# Patient Record
Sex: FEMALE | Race: WHITE
[De-identification: names, ages, dates, MRNs, and addresses within clinical notes are randomized per-mention and may not be internally consistent; named-entity substitution may affect disease eponyms.]

---

## 2018-05-24 ENCOUNTER — HOSPITAL ENCOUNTER (INPATIENT)
Dept: HOSPITAL 17 - NEPD | Age: 68
LOS: 26 days | Discharge: NURSING, REHAB OR LONG-TERM CARE | DRG: 885 | End: 2018-06-19
Attending: PSYCHIATRY & NEUROLOGY | Admitting: PSYCHIATRY & NEUROLOGY
Payer: COMMERCIAL

## 2018-05-24 VITALS
DIASTOLIC BLOOD PRESSURE: 82 MMHG | SYSTOLIC BLOOD PRESSURE: 139 MMHG | OXYGEN SATURATION: 97 % | RESPIRATION RATE: 14 BRPM | HEART RATE: 86 BPM

## 2018-05-24 VITALS
HEART RATE: 83 BPM | DIASTOLIC BLOOD PRESSURE: 119 MMHG | SYSTOLIC BLOOD PRESSURE: 157 MMHG | OXYGEN SATURATION: 97 % | RESPIRATION RATE: 18 BRPM | TEMPERATURE: 99.3 F

## 2018-05-24 VITALS
OXYGEN SATURATION: 94 % | SYSTOLIC BLOOD PRESSURE: 118 MMHG | HEART RATE: 97 BPM | TEMPERATURE: 99.1 F | DIASTOLIC BLOOD PRESSURE: 61 MMHG | RESPIRATION RATE: 18 BRPM

## 2018-05-24 VITALS
DIASTOLIC BLOOD PRESSURE: 87 MMHG | RESPIRATION RATE: 17 BRPM | SYSTOLIC BLOOD PRESSURE: 162 MMHG | OXYGEN SATURATION: 100 % | HEART RATE: 68 BPM

## 2018-05-24 VITALS — HEIGHT: 63 IN | WEIGHT: 197.31 LBS | BODY MASS INDEX: 34.96 KG/M2

## 2018-05-24 VITALS
HEART RATE: 67 BPM | OXYGEN SATURATION: 96 % | DIASTOLIC BLOOD PRESSURE: 82 MMHG | RESPIRATION RATE: 16 BRPM | SYSTOLIC BLOOD PRESSURE: 134 MMHG

## 2018-05-24 DIAGNOSIS — F22: ICD-10-CM

## 2018-05-24 DIAGNOSIS — I25.10: ICD-10-CM

## 2018-05-24 DIAGNOSIS — M25.511: ICD-10-CM

## 2018-05-24 DIAGNOSIS — E78.5: ICD-10-CM

## 2018-05-24 DIAGNOSIS — M19.90: ICD-10-CM

## 2018-05-24 DIAGNOSIS — R45.851: ICD-10-CM

## 2018-05-24 DIAGNOSIS — J44.9: ICD-10-CM

## 2018-05-24 DIAGNOSIS — Z81.1: ICD-10-CM

## 2018-05-24 DIAGNOSIS — G89.4: ICD-10-CM

## 2018-05-24 DIAGNOSIS — I25.2: ICD-10-CM

## 2018-05-24 DIAGNOSIS — K21.9: ICD-10-CM

## 2018-05-24 DIAGNOSIS — I50.9: ICD-10-CM

## 2018-05-24 DIAGNOSIS — I13.0: ICD-10-CM

## 2018-05-24 DIAGNOSIS — Z79.899: ICD-10-CM

## 2018-05-24 DIAGNOSIS — N18.4: ICD-10-CM

## 2018-05-24 DIAGNOSIS — M25.562: ICD-10-CM

## 2018-05-24 DIAGNOSIS — Z96.641: ICD-10-CM

## 2018-05-24 DIAGNOSIS — G40.909: ICD-10-CM

## 2018-05-24 DIAGNOSIS — F31.9: ICD-10-CM

## 2018-05-24 DIAGNOSIS — G47.30: ICD-10-CM

## 2018-05-24 DIAGNOSIS — D64.9: ICD-10-CM

## 2018-05-24 DIAGNOSIS — E87.1: ICD-10-CM

## 2018-05-24 DIAGNOSIS — R73.03: ICD-10-CM

## 2018-05-24 DIAGNOSIS — F29: Primary | ICD-10-CM

## 2018-05-24 DIAGNOSIS — Z91.5: ICD-10-CM

## 2018-05-24 DIAGNOSIS — F41.8: ICD-10-CM

## 2018-05-24 DIAGNOSIS — Z81.8: ICD-10-CM

## 2018-05-24 DIAGNOSIS — Z91.19: ICD-10-CM

## 2018-05-24 DIAGNOSIS — E78.00: ICD-10-CM

## 2018-05-24 DIAGNOSIS — C92.10: ICD-10-CM

## 2018-05-24 LAB
ALBUMIN SERPL-MCNC: 4.2 GM/DL (ref 3.4–5)
ALP SERPL-CCNC: 130 U/L (ref 45–117)
ALT SERPL-CCNC: 15 U/L (ref 10–53)
AST SERPL-CCNC: 26 U/L (ref 15–37)
BACTERIA #/AREA URNS HPF: (no result) /HPF
BASOPHILS # BLD AUTO: 0.2 TH/MM3 (ref 0–0.2)
BASOPHILS NFR BLD: 1.3 % (ref 0–2)
BILIRUB SERPL-MCNC: 0.5 MG/DL (ref 0.2–1)
BUN SERPL-MCNC: 22 MG/DL (ref 7–18)
CALCIUM SERPL-MCNC: 9.2 MG/DL (ref 8.5–10.1)
CHLORIDE SERPL-SCNC: 97 MEQ/L (ref 98–107)
COLOR UR: (no result)
CREAT SERPL-MCNC: 1.4 MG/DL (ref 0.5–1)
EOSINOPHIL # BLD: 0.2 TH/MM3 (ref 0–0.4)
EOSINOPHIL NFR BLD: 1.3 % (ref 0–4)
ERYTHROCYTE [DISTWIDTH] IN BLOOD BY AUTOMATED COUNT: 14.1 % (ref 11.6–17.2)
GFR SERPLBLD BASED ON 1.73 SQ M-ARVRAT: 37 ML/MIN (ref 89–?)
GLUCOSE SERPL-MCNC: 94 MG/DL (ref 74–106)
GLUCOSE UR STRIP-MCNC: (no result) MG/DL
HCO3 BLD-SCNC: 23.1 MEQ/L (ref 21–32)
HCT VFR BLD CALC: 43.2 % (ref 35–46)
HGB BLD-MCNC: 14.5 GM/DL (ref 11.6–15.3)
HGB UR QL STRIP: (no result)
HYALINE CASTS #/AREA URNS LPF: 2 /LPF
KETONES UR STRIP-MCNC: (no result) MG/DL
LYMPHOCYTES # BLD AUTO: 1.5 TH/MM3 (ref 1–4.8)
LYMPHOCYTES NFR BLD AUTO: 11.4 % (ref 9–44)
MCH RBC QN AUTO: 29.7 PG (ref 27–34)
MCHC RBC AUTO-ENTMCNC: 33.5 % (ref 32–36)
MCV RBC AUTO: 88.6 FL (ref 80–100)
MONOCYTE #: 0.9 TH/MM3 (ref 0–0.9)
MONOCYTES NFR BLD: 6.7 % (ref 0–8)
MUCOUS THREADS #/AREA URNS LPF: (no result) /LPF
NEUTROPHILS # BLD AUTO: 10.7 TH/MM3 (ref 1.8–7.7)
NEUTROPHILS NFR BLD AUTO: 79.3 % (ref 16–70)
NITRITE UR QL STRIP: (no result)
PLATELET # BLD: 285 TH/MM3 (ref 150–450)
PMV BLD AUTO: 9.8 FL (ref 7–11)
PROT SERPL-MCNC: 8.5 GM/DL (ref 6.4–8.2)
RBC # BLD AUTO: 4.88 MIL/MM3 (ref 4–5.3)
SODIUM SERPL-SCNC: 132 MEQ/L (ref 136–145)
SP GR UR STRIP: 1.01 (ref 1–1.03)
SQUAMOUS #/AREA URNS HPF: 1 /HPF (ref 0–5)
URINE LEUKOCYTE ESTERASE: (no result)
WBC # BLD AUTO: 13.5 TH/MM3 (ref 4–11)

## 2018-05-24 PROCEDURE — 83036 HEMOGLOBIN GLYCOSYLATED A1C: CPT

## 2018-05-24 PROCEDURE — 80048 BASIC METABOLIC PNL TOTAL CA: CPT

## 2018-05-24 PROCEDURE — 80053 COMPREHEN METABOLIC PANEL: CPT

## 2018-05-24 PROCEDURE — 80061 LIPID PANEL: CPT

## 2018-05-24 PROCEDURE — 93005 ELECTROCARDIOGRAM TRACING: CPT

## 2018-05-24 PROCEDURE — 80307 DRUG TEST PRSMV CHEM ANLYZR: CPT

## 2018-05-24 PROCEDURE — 99285 EMERGENCY DEPT VISIT HI MDM: CPT

## 2018-05-24 PROCEDURE — 85025 COMPLETE CBC W/AUTO DIFF WBC: CPT

## 2018-05-24 PROCEDURE — 81001 URINALYSIS AUTO W/SCOPE: CPT

## 2018-05-24 PROCEDURE — 84443 ASSAY THYROID STIM HORMONE: CPT

## 2018-05-24 RX ADMIN — LISINOPRIL SCH MG: 5 TABLET ORAL at 13:00

## 2018-05-24 RX ADMIN — METOPROLOL TARTRATE SCH MG: 25 TABLET, FILM COATED ORAL at 20:52

## 2018-05-24 RX ADMIN — PACLITAXEL SCH MG: 6 INJECTION, SOLUTION, CONCENTRATE INTRAVENOUS at 10:00

## 2018-05-24 NOTE — PD
HPI


Chief Complaint:  Psychiatric Symptoms


Time Seen by Provider:  01:48


Travel History


International Travel<30 days:  No


Contact w/Intl Traveler<30days:  No


Traveled to known affect area:  No





History of Present Illness


HPI


68-year-old white female presents emergency department under [place] act by PD.  

Patient had contacted police advising them that she was feeling suicidal.  The 

patient states that she merely wanted to get out of the house.  She has no plan 

on self-harm.  No homicidal ideation.  She states that she lives with her 

granddaughter.  She had moved back to the area from [place] after living with 

her son.  She states that she is currently under the care of the cancer doctor 

at [facility].  She is being treated for CML.  Patient also has a history 

of.  Anemia arthritis chronic pain syndrome morphine overdose CAD dyslipidemia 

CHF COPD anxiety depression hypertension renal insufficiency myocardial 

infarction seizure sleep apnea D&C tubal ligation .patient denies any toxic 

ingestions.  She denies any drugs or alcohol.  No tobacco.





PFSH


Past Medical History


*** Narrative Medical


CML, anemia arthritis chronic pain syndrome morphine overdose CAD dyslipidemia 

CHF COPD anxiety depression hypertension renal insufficiency myocardial 

infarction seizure sleep apnea D&C tubal ligation


Anemia:  Yes


Arthritis:  Yes (RA and osteo)


Asthma:  Yes


Autoimmune Disease:  Yes (RA)


Anxiety:  Yes


Depression:  Yes


Heart Rhythm Problems:  No


Cancer:  No


Cardiovascular Problems:  Yes (HTN)


High Cholesterol:  Yes


Chest Pain:  Yes


Congestive Heart Failure:  Yes


COPD:  Yes


Cerebrovascular Accident:  No


Diabetes:  No


Diminished Hearing:  No


Endocrine:  No


GERD:  Yes


Glaucoma:  No


Genitourinary:  Yes


Headaches:  No


Hepatitis:  No


Hiatal Hernia:  No


Hypertension:  Yes


Immune Disorder:  Yes


Implanted Vascular Access Dvce:  Yes


Kidney Stones:  No


Musculoskeletal:  Yes


Neurologic:  No


Psychiatric:  Yes


Reproductive:  Yes


Respiratory:  Yes


Migraines:  No


Myocardial Infarction:  Yes


Renal Failure:  Yes


Seizures:  Yes (AROUND 30 YEARS OLD )


Sleep Apnea:  Yes


Thyroid Disease:  No


Ulcer:  No


PNEUMOCCOCAL Vaccine (Year):  2010


Menopausal:  Yes


Dilation and Curettage (D&C):  Yes


Tubal Ligation:  Yes





Past Surgical History


Abdominal Surgery:  No


Appendectomy:  No


Cardiac Surgery:  No


Cholecystectomy:  No


Ear Surgery:  No


Endocrine Surgery:  No


Eye Surgery:  No


Genitourinary Surgery:  No


Gynecologic Surgery:  Yes


Joint Replacement:  Yes (right shoulder,left hip)


Oral Surgery:  No


Thoracic Surgery:  No


Other Surgery:  Yes





Social History


Alcohol Use:  No


Tobacco Use:  No


Substance Use:  No





Allergies-Medications


(Allergen,Severity, Reaction):  


Coded Allergies:  


     azithromycin (Unverified  Allergy, Severe, RASH, 5/24/18)


     buspirone (Unverified  Allergy, Severe, RASH, 5/24/18)


     erythromycin base (Unverified  Allergy, Severe, RASH, 5/24/18)


     penicillin G (Unverified  Allergy, Severe, NAUSEA,VOMITING,RASH, 5/24/18)


     phenelzine (Unverified  Allergy, Severe, RASH,INSOMNIA, 5/24/18)


Reported Meds & Prescriptions





Reported Meds & Active Scripts


Active


Reported


Simvastatin 5 Mg Tab 5 Mg PO DAILY


Lisinopril 5 Mg Tab 5 Mg PO DAILY


Metoprolol Tartrate 25 Mg Tab 25 Mg PO BID








Review of Systems


General / Constitutional:  No: Fever


Eyes:  No: Visual changes


HENT:  Positive: Headaches


Cardiovascular:  No: Chest Pain or Discomfort


Respiratory:  No: Shortness of Breath


Gastrointestinal:  Positive: Constipation, Loss of Appetite, No: Nausea, 

Vomiting, Abdominal Pain


Genitourinary:  No: Dysuria


Musculoskeletal:  Positive: Myalgias, Arthralgias, No: Pain


Skin:  No Rash


Neurologic:  No: Weakness, Syncope, Focal Abnormalities


Psychiatric:  Positive: Mood Disorder, No: Depression, Suicidal Ideations, 

Disorder of Thought, Substance Abuse, Homicidal Ideation


Endocrine:  No: Polydipsia


Hematologic/Lymphatic:  No: Easy Bruising





Physical Exam


Narrative


GENERAL: Well-nourished, well-developed patient.


SKIN: Warm and dry.


HEAD: Normocephalic and atraumatic.


EYES: No scleral icterus. No injection or drainage. 


ENT: No nasal drainage noted. Mucous membranes pink. Airway patent.


NECK: Supple, trachea midline.  Moves head freely without obvious discomfort.


CARDIOVASCULAR: Regular rate and rhythm without murmurs, gallops, or rubs. 


RESPIRATORY: Breath sounds equal bilaterally. No accessory muscle use.


GASTROINTESTINAL: Abdomen soft, non-tender, nondistended. 


EXTREMITIES: No cyanosis or edema.


BACK: Nontender without obvious deformity. No CVA tenderness.


NEURO: Patient is alert and oriented. no sensorimotor deficits.  Nonfocal.  

Normal speech.


PSYCH: No delusions.  No auditory or visual hallucinations.





Data


Data


Last Documented VS





Vital Signs








  Date Time  Temp Pulse Resp B/P (MAP) Pulse Ox O2 Delivery O2 Flow Rate FiO2


 


5/24/18 01:52  67 16 134/82 (99) 96   








Orders





 Orders


Complete Blood Count With Diff (5/24/18 01:48)


Comprehensive Metabolic Panel (5/24/18 01:48)


Thyroid Stimulating Hormone (5/24/18 01:48)


Urinalysis - C+S If Indicated (5/24/18 01:48)


Psych Screen (5/24/18 01:48)


Drug Screen, Random Urine (5/24/18 01:48)


Alcohol (Ethanol) (5/24/18 01:48)





Labs





Laboratory Tests








Test


  5/24/18


02:07


 


White Blood Count 13.5 TH/MM3 


 


Red Blood Count 4.88 MIL/MM3 


 


Hemoglobin 14.5 GM/DL 


 


Hematocrit 43.2 % 


 


Mean Corpuscular Volume 88.6 FL 


 


Mean Corpuscular Hemoglobin 29.7 PG 


 


Mean Corpuscular Hemoglobin


Concent 33.5 % 


 


 


Red Cell Distribution Width 14.1 % 


 


Platelet Count 285 TH/MM3 


 


Mean Platelet Volume 9.8 FL 


 


Neutrophils (%) (Auto) 79.3 % 


 


Lymphocytes (%) (Auto) 11.4 % 


 


Monocytes (%) (Auto) 6.7 % 


 


Eosinophils (%) (Auto) 1.3 % 


 


Basophils (%) (Auto) 1.3 % 


 


Neutrophils # (Auto) 10.7 TH/MM3 


 


Lymphocytes # (Auto) 1.5 TH/MM3 


 


Monocytes # (Auto) 0.9 TH/MM3 


 


Eosinophils # (Auto) 0.2 TH/MM3 


 


Basophils # (Auto) 0.2 TH/MM3 


 


CBC Comment DIFF FINAL 


 


Differential Comment  


 


Urine Color LIGHT-YELLOW 


 


Urine Turbidity CLEAR 


 


Urine pH 6.0 


 


Urine Specific Gravity 1.006 


 


Urine Protein NEG mg/dL 


 


Urine Glucose (UA) NEG mg/dL 


 


Urine Ketones NEG mg/dL 


 


Urine Occult Blood NEG 


 


Urine Nitrite NEG 


 


Urine Bilirubin NEG 


 


Urine Urobilinogen


  LESS THAN 2.0


MG/DL


 


Urine Leukocyte Esterase TRACE 


 


Urine RBC 1 /hpf 


 


Urine WBC 1 /hpf 


 


Urine Squamous Epithelial


Cells 1 /hpf 


 


 


Urine Bacteria RARE /hpf 


 


Urine Hyaline Casts 2 /lpf 


 


Urine Mucus FEW /lpf 


 


Microscopic Urinalysis Comment


  CULT NOT


INDICATED


 


Blood Urea Nitrogen 22 MG/DL 


 


Creatinine 1.40 MG/DL 


 


Random Glucose 94 MG/DL 


 


Total Protein 8.5 GM/DL 


 


Albumin 4.2 GM/DL 


 


Calcium Level 9.2 MG/DL 


 


Alkaline Phosphatase 130 U/L 


 


Aspartate Amino Transf


(AST/SGOT) 26 U/L 


 


 


Alanine Aminotransferase


(ALT/SGPT) 15 U/L 


 


 


Total Bilirubin 0.5 MG/DL 


 


Sodium Level 132 MEQ/L 


 


Potassium Level 4.0 MEQ/L 


 


Chloride Level 97 MEQ/L 


 


Carbon Dioxide Level 23.1 MEQ/L 


 


Anion Gap 12 MEQ/L 


 


Estimat Glomerular Filtration


Rate 37 ML/MIN 


 


 


Thyroid Stimulating Hormone


3rd Gen 3.400 uIU/ML 


 


 


Urine Opiates Screen NEG 


 


Urine Barbiturates Screen NEG 


 


Urine Amphetamines Screen NEG 


 


Urine Benzodiazepines Screen NEG 


 


Urine Cocaine Screen NEG 


 


Urine Cannabinoids Screen NEG 


 


Ethyl Alcohol Level


  LESS THAN 3


MG/DL











MDM


Medical Decision Making


Medical Screen Exam Complete:  Yes


Emergency Medical Condition:  Yes


Medical Record Reviewed:  Yes


Interpretation(s)





Laboratory Tests








Test


  5/24/18


02:07


 


White Blood Count 13.5 TH/MM3 


 


Red Blood Count 4.88 MIL/MM3 


 


Hemoglobin 14.5 GM/DL 


 


Hematocrit 43.2 % 


 


Mean Corpuscular Volume 88.6 FL 


 


Mean Corpuscular Hemoglobin 29.7 PG 


 


Mean Corpuscular Hemoglobin


Concent 33.5 % 


 


 


Red Cell Distribution Width 14.1 % 


 


Platelet Count 285 TH/MM3 


 


Mean Platelet Volume 9.8 FL 


 


Neutrophils (%) (Auto) 79.3 % 


 


Lymphocytes (%) (Auto) 11.4 % 


 


Monocytes (%) (Auto) 6.7 % 


 


Eosinophils (%) (Auto) 1.3 % 


 


Basophils (%) (Auto) 1.3 % 


 


Neutrophils # (Auto) 10.7 TH/MM3 


 


Lymphocytes # (Auto) 1.5 TH/MM3 


 


Monocytes # (Auto) 0.9 TH/MM3 


 


Eosinophils # (Auto) 0.2 TH/MM3 


 


Basophils # (Auto) 0.2 TH/MM3 


 


CBC Comment DIFF FINAL 


 


Differential Comment  


 


Urine Color LIGHT-YELLOW 


 


Urine Turbidity CLEAR 


 


Urine pH 6.0 


 


Urine Specific Gravity 1.006 


 


Urine Protein NEG mg/dL 


 


Urine Glucose (UA) NEG mg/dL 


 


Urine Ketones NEG mg/dL 


 


Urine Occult Blood NEG 


 


Urine Nitrite NEG 


 


Urine Bilirubin NEG 


 


Urine Urobilinogen


  LESS THAN 2.0


MG/DL


 


Urine Leukocyte Esterase TRACE 


 


Urine RBC 1 /hpf 


 


Urine WBC 1 /hpf 


 


Urine Squamous Epithelial


Cells 1 /hpf 


 


 


Urine Bacteria RARE /hpf 


 


Urine Hyaline Casts 2 /lpf 


 


Urine Mucus FEW /lpf 


 


Microscopic Urinalysis Comment


  CULT NOT


INDICATED


 


Blood Urea Nitrogen 22 MG/DL 


 


Creatinine 1.40 MG/DL 


 


Random Glucose 94 MG/DL 


 


Total Protein 8.5 GM/DL 


 


Albumin 4.2 GM/DL 


 


Calcium Level 9.2 MG/DL 


 


Alkaline Phosphatase 130 U/L 


 


Aspartate Amino Transf


(AST/SGOT) 26 U/L 


 


 


Alanine Aminotransferase


(ALT/SGPT) 15 U/L 


 


 


Total Bilirubin 0.5 MG/DL 


 


Sodium Level 132 MEQ/L 


 


Potassium Level 4.0 MEQ/L 


 


Chloride Level 97 MEQ/L 


 


Carbon Dioxide Level 23.1 MEQ/L 


 


Anion Gap 12 MEQ/L 


 


Estimat Glomerular Filtration


Rate 37 ML/MIN 


 


 


Thyroid Stimulating Hormone


3rd Gen 3.400 uIU/ML 


 


 


Urine Opiates Screen NEG 


 


Urine Barbiturates Screen NEG 


 


Urine Amphetamines Screen NEG 


 


Urine Benzodiazepines Screen NEG 


 


Urine Cocaine Screen NEG 


 


Urine Cannabinoids Screen NEG 


 


Ethyl Alcohol Level


  LESS THAN 3


MG/DL








Differential Diagnosis


MDM: High


Differential diagnoses: Schizophrenia, schizoaffective disorder, bipolar, 

anxiety, depression, adjustment reaction, mood disorder NOS, ODD, depressive 

disorder NOS, dementia, dementia with agitation, psychosis NOS, substance 

induced mood disorder, infection,electrolyte abnormality, malingering.


Narrative Course


Mental health screening discussed with the patient.  Psychiatric screen ordered.





The patient has been medically cleared.





This is medical clearance for psychiatric admission





Diagnosis





 Primary Impression:  


 Medical clearance for psychiatric admission


 Additional Impression:  


 CKD (chronic kidney disease)


 Qualified Codes:  N18.9 - Chronic kidney disease, unspecified


Condition:  Stable











John Hogan May 24, 2018 02:14

## 2018-05-24 NOTE — HHI.HP
Provisional Diagnosis


Admission Date


May 24, 2018 at 10:07





                               Certification of Person's Competence 


                           To Provide Express and Informed Consent





I have personally examined Kristi Carrillo , a person being served at [facility] on, May 24, 2018 10:43.


Express and informed consent means consent voluntarily given in writing, by a 

competent person, after sufficient explanation and disclosure of the subject 

matter involved to enable the person to make a knowing and willful decision 

without any element of force, fraud, deceit, duress, or other form of 

constraint or coercion.





This person is 18 years of age or older, is not now known to be incompetent to 

consent to treatment with a guardian advocate, and does not have a health care 

surrogate or proxy currently making medical treatment decisions.  I have found 

this person to be one of the following:





[] Competent to provide express and informed consent, as defined above, for 

voluntary admission to this facility and is competent to provide express and 

informed consent for treatment.  He/she has the consistent capacity to make 

well reasoned, willful, and knowing decisions concerning his or her medical or 

mental health treatment.  The person fully and consistently understands the 

purpose of the admission for examination/placement and is fully capable of 

personally exercising all rights assured [place]





[] Incompetent to provide express and informed consent to voluntary admission, 

and this is incompetent to provide express and informed consent to treatment.  

The person must be transferred to involuntary status and a petition for a 

guardian advocate filed with the Circuit Court.





[] Refusing to provide express and informed consent to voluntary admission but 

is competent to provide express and informed consent for treatment.  The person 

must be discharged or transferred to involuntary status.





Form shall be completed within 24 hours of a person's arrival at the receiving 

facility and filed in the clinical record of each person:


1. Admitted on a voluntary basis


2. Permitted to provide express and informed consent to his/her own treatment


3. Allowed to transfer from involuntary to voluntary status


4. Prior to permitting a person to consent to his or her own treatment after 

having been previously found incompetent to consent to treatment.





History of Present Illness


Capacity:  Has Capacity


HPI


The patient 68-year-old  woman, domiciled with her granddaughter in 

[facility], , mother of 3 kids, supported by Social Security, psychiatric 

history of alcohol and benzodiazepine use disorder, bipolar disorder, anxiety, 

psychiatric admissions, one suicidal attempt by overdosing, outpatient 

psychiatric care with Dr. Hampton, she is in Lexapro 20 mg, Xanax 2 mg 3 times 

daily, medical history of CML, CAD, CHF, dyslipidemia, hypertension COPD, who 

presents emergency department under [place] act by PD.  Patient had contacted 

police advising them that she was feeling suicidal.  The patient states that 

she merely wanted to get out of the house.  She has no plan on self-harm.  No 

homicidal ideation.  She states that she lives with her granddaughter.  She had 

moved back to the area from [place] after living with her son.  She states 

that she is currently under the care of the cancer doctor at [facility].  

She is being treated for CML.  Patient also has a history of.  Anemia arthritis 

chronic pain syndrome morphine overdose CAD dyslipidemia CHF COPD anxiety 

depression hypertension renal insufficiency myocardial infarction seizure sleep 

apnea D&C tubal ligation .patient denies any toxic ingestions.  EMR was 

reviewed.  Collateral information from her granddaughter Brittany Cruz [phone], was attempted, but unfortunately not obtained at this moment.  Case 

was discussed with ER team.  On psychiatric evaluation the patient is found in 

distress, very anxious stating that she feels scared.  Patient states that if 

you has to go back to what she is living now with her granddaughter she is 

going to kill herself.  She reports that her daughter has been stealing her 

money, stealing her Xanax and talking against her with her family.  The patient 

is very tearful during the evaluation, stating that all her family is now a 

conspiracy "to get me out of my house, to maybe look like a drug addict".  She 

says that her family are recording or her movement with cameras, and as the 

patient states these, she started looking around the room "I know that our, or 

is here, they are recording my behavior to accuse me".  The patient reports 

that her daughter has been stealing her Xanax " and them she comes to me 

accusing me that I am not overusing this medication".  The patient repeats 

multiple times that she prefers to kill herself then going back to her 

granddaughter's house per.  Patient reports symptoms of depression, anxiety, 

she says that she has been feeling restless, with multiple intrusive thoughts 

of her family talking about her, helplessness, hopelessness, suicidal ideation, 

with a plan of overdosing, denies homicidal ideation, she denies visual and 

auditory hallucinations.  Patient is fully oriented 3.  No attention deficit, 

no fluctuation of consciousness.  The patient denies the use of alcohol.  She 

reports that she was an alcoholic in the past but she has being over a year 

sober.  She denies the use of illegal drugs.





Review of Systems


Constitutional:  DENIES: Diaphoretic episodes, Fatigue, Fever, Weight gain, 

Weight loss, Chills, Dizziness, Change in appetite, Night Sweats


Endocrine:  DENIES: Abnorml menstrual pattern, Heat/cold intolerance, Polydipsia

, Polyuria, Polyphagia


Eyes:  DENIES: Blurred vision, Diplopia, Eye inflammation, Eye pain, Vision loss

, Photosensitivity, Double Vision


Ears, nose, mouth, throat:  DENIES: Tinnitus, Hearing loss, Vertigo, Nasal 

discharge, Oral lesions, Throat pain, Hoarseness, Ear Pain, Running Nose, 

Epistaxis, Sinus Pain, Toothache, Odynophagia


Respiratory:  DENIES: Apneas, Cough, Snoring, Wheezing, Hemoptysis, Sputum 

production, Shortness of breath


Cardiovascular:  DENIES: Chest pain, Palpitations, Syncope, Dyspnea on Exertion

, PND, Lower Extremity Edema, Orthopnea, Claudication


Gastrointestinal:  DENIES: Abdominal pain, Black stools, Bloody stools, 

Constipation, Diarrhea, Nausea, Vomiting, Difficulty Swallowing, Anorexia


Genitourinary:  DENIES: Abnormal vaginal bleeding, Dysmenorrhea, Dyspareunia, 

Sexual dysfunction, Urinary frequency, Urinary incontinence, Urgency, Hematuria

, Dysuria, Nocturia, Vaginal discharge


Musculoskeletal:  DENIES: Joint pain, Muscle aches, Stiffness, Joint Swelling, 

Back pain, Neck pain


Integumentary:  DENIES: Abnormal pigmentation, Pruritus, Rash, Nail changes, 

Breast masses, Breast skin changes, Nipple discharge


Hematologic/lymphatic:  DENIES: Bruising, Lymphadenopathy


Immunologic/allergic:  DENIES: Eczema, Urticaria


Neurologic:  DENIES: Abnormal gait, Headache, Localized weakness, Paresthesias, 

Seizures, Speech Problems, Tremor, Poor Balance


Psychiatric:  COMPLAINS OF: Anxiety, Suicidal Ideation, Delusions, DENIES: 

Confusion, Mood changes, Depression, Hallucinations, Agitation, Homicidal 

Ideation





Past Psych History


Violence risk - self (6 mos)


Increased





Substance Abuse History


Drugs/Alcohol past 12 months


Patient has history of alcohol abuse, she is in remission now, she also has 

history of benzodiazepines abuse





Past Family Social History


Coded Allergies:  


     azithromycin (Unverified  Allergy, Severe, RASH, 5/24/18)


     buspirone (Unverified  Allergy, Severe, RASH, 5/24/18)


     erythromycin base (Unverified  Allergy, Severe, RASH, 5/24/18)


     penicillin G (Unverified  Allergy, Severe, NAUSEA,VOMITING,RASH, 5/24/18)


     phenelzine (Unverified  Allergy, Severe, RASH,INSOMNIA, 5/24/18)


Reported Medications


Simvastatin (Simvastatin) 5 Mg Tab, 5 MG PO DAILY for Cholesterol Management, #

30 TAB 0 Refills


   5/24/18


Lisinopril (Lisinopril) 5 Mg Tab, 5 MG PO DAILY for Blood Pressure Management, #

30 TAB 0 Refills


   5/24/18


Metoprolol Tartrate (Metoprolol Tartrate) 25 Mg Tab, 25 MG PO BID, #60 TAB 0 

Refills


   5/24/18


Discontinued Reported Medications


Simvastatin (Zocor 40 mg) 40 Mg Tab, 1 TAB PO HS, TAB


   1/22/15


Furosemide (Lasix 20 Mg  Tab) 20 Mg Tab, 20 MG PO DAILY, TAB


   1/22/15


Lisinopril 10 mg (Lisinopril 10 mg) 10 Mg Tab, 1 TAB PO DAILY, TAB


   1/22/15


Lamotrigine (Lamictal) Unknown Strength Tab, PO DAILY, TAB


   1/22/15


Metoprolol Tartrate (Lopressor) 50 Mg Tab, 50 MG PO BID, #60 TAB


   10/4/13


Clonazepam (Clonazepam) 1 Mg Tab, 1 MG PO QID for ANXIETY, #60 TAB


   10/4/13


Discontinued Scripts


Ranitidine HCl (Zantac 150 Mg Tab) 150 Mg Tab, 150 MG PO BID for GERD, #60 TAB


   Prov:Meme Hernandez, DO         2/14/15


Escitalopram Oxalate (Lexapro) 10 Mg Tab, 10 MG PO DAILY for LYNNE, #30 TAB


   Prov:Meme Hernandez, DO         2/14/15


Temazepam 15 mg (Restoril 15 mg) 15 Mg Cap, 15 MG PO HS Y for insomnia, #30 CAP


   Prov:Meme Hernandez, DO         2/13/15


Nitroglycerin (Nitroglycerin Tab 0.4 Mg Sl) 0.4 Mg Subl, 0.4 MG SL UNSCH Y for 

CHEST PAIN, #50 TAB


   Prov:Meme Hernandez, DO         2/13/15


Morphine Sulfate (Morphine Sulfate IR 15 mg) 15 Mg Tab, 15 MG PO Q12H for PAIN, 

#60 TAB


   Prov:Meme Hernandez, DO         2/13/15


Calcium Carbonate (Calcium Carbonate) 500 Mg Chew, 500 MG CHEW Q12 for LOW JULIA, 

#60 TAB


   Prov:Meme Hernandez, DO         2/13/15


Calcitriol (Rocaltrol) 0.25 Mcg Cap, 0.25 MCG PO DAILY for LOW JULIA, #30 CAP


   Prov:Meme Hernandez, DO         2/13/15


Albuterol/Ipratropium (Resp: Albuterol/Ipratropium 2.5 Mg/0.5 Mg) 1 Amp Nebu, 1 

AMP INH Q4-RT Y for DYSPNEA, #60 BOX


   Prov:Monse HernandezMeme A, DO         2/13/15





Current Medications








 Medications


  (Trade)  Dose


 Ordered  Sig/Randall


 Route  Start Time


 Stop Time Status Last Admin


 


  (Prinivil)  5 mg  DAILY


 PO  5/24/18 10:00


     


 


 


  (Lopressor)  25 mg  BID


 PO  5/24/18 21:00


     


 


 


  (Pravachol)  10 mg  DAILY


 PO  5/24/18 10:00


     


 


 


  (Ativan)  0.5 mg  Q12H  PRN


 PO  5/24/18 10:15


     


 


 


  (Ativan Inj)  0.5 mg  Q12H  PRN


 IM  5/24/18 10:15


     


 


 


  (Tylenol)  650 mg  Q4H  PRN


 PO  5/24/18 10:15


     


 


 


  (Milk Of


 Magnesia Liq)  30 ml  DAILY  PRN


 PO  5/24/18 10:15


     


 


 


  (Mag-Al Plus


 Susp Liq)  30 ml  Q6H  PRN


 PO  5/24/18 10:15


     


 


 


  (Romazicon Inj)  0.2 mg  Q1M  PRN


 IV PUSH  5/24/18 10:15


     


 


 


  (Ativan)  1 mg  Q4H  PRN


 PO  5/24/18 10:15


     


 


 


  (Ativan Inj)  1 mg  Q4H  PRN


 IV PUSH  5/24/18 10:15


     


 


 


  (Ativan)  2 mg  Q2H  PRN


 PO  5/24/18 10:15


     


 


 


  (Ativan Inj)  2 mg  Q2H  PRN


 IV PUSH  5/24/18 10:15


     


 


 


  (Ativan Inj)  2 mg  Q1H  PRN


 IV PUSH  5/24/18 10:15


     


 


 


  (Ativan Inj)  2 mg  Q15M  PRN


 IV PUSH  5/24/18 10:15


     


 








Family Psych History


Patient has a sister and her mother with bipolar disease


Social History


Patient was born and raised in [place], she is now living with her 

granddaughter in [facility], she is , mother of 3 kids, supported by 

Social Security, her highest level of the patient is a west high


Patient's Strengths (min. 2)


Outpatient psychiatric





Physical Exam


Patient is a little bit agitated, restless, stress, but no bessy withdrawal, no 

EPS, no gait disturbance present


Vital Signs





Vital Signs








  Date Time  Temp Pulse Resp B/P (MAP) Pulse Ox O2 Delivery O2 Flow Rate FiO2


 


5/24/18 07:32  68 17 162/87 (112) 100 Room Air  








Lab Results











Test


  5/24/18


02:07


 


White Blood Count 13.5 TH/MM3 


 


Red Blood Count 4.88 MIL/MM3 


 


Hemoglobin 14.5 GM/DL 


 


Hematocrit 43.2 % 


 


Mean Corpuscular Volume 88.6 FL 


 


Mean Corpuscular Hemoglobin 29.7 PG 


 


Mean Corpuscular Hemoglobin


Concent 33.5 % 


 


 


Red Cell Distribution Width 14.1 % 


 


Platelet Count 285 TH/MM3 


 


Mean Platelet Volume 9.8 FL 


 


Neutrophils (%) (Auto) 79.3 % 


 


Lymphocytes (%) (Auto) 11.4 % 


 


Monocytes (%) (Auto) 6.7 % 


 


Eosinophils (%) (Auto) 1.3 % 


 


Basophils (%) (Auto) 1.3 % 


 


Neutrophils # (Auto) 10.7 TH/MM3 


 


Lymphocytes # (Auto) 1.5 TH/MM3 


 


Monocytes # (Auto) 0.9 TH/MM3 


 


Eosinophils # (Auto) 0.2 TH/MM3 


 


Basophils # (Auto) 0.2 TH/MM3 


 


CBC Comment DIFF FINAL 


 


Differential Comment  


 


Urine Color LIGHT-YELLOW 


 


Urine Turbidity CLEAR 


 


Urine pH 6.0 


 


Urine Specific Gravity 1.006 


 


Urine Protein NEG mg/dL 


 


Urine Glucose (UA) NEG mg/dL 


 


Urine Ketones NEG mg/dL 


 


Urine Occult Blood NEG 


 


Urine Nitrite NEG 


 


Urine Bilirubin NEG 


 


Urine Urobilinogen


  LESS THAN 2.0


MG/DL


 


Urine Leukocyte Esterase TRACE 


 


Urine RBC 1 /hpf 


 


Urine WBC 1 /hpf 


 


Urine Squamous Epithelial


Cells 1 /hpf 


 


 


Urine Bacteria RARE /hpf 


 


Urine Hyaline Casts 2 /lpf 


 


Urine Mucus FEW /lpf 


 


Microscopic Urinalysis Comment


  CULT NOT


INDICATED


 


Blood Urea Nitrogen 22 MG/DL 


 


Creatinine 1.40 MG/DL 


 


Random Glucose 94 MG/DL 


 


Total Protein 8.5 GM/DL 


 


Albumin 4.2 GM/DL 


 


Calcium Level 9.2 MG/DL 


 


Alkaline Phosphatase 130 U/L 


 


Aspartate Amino Transf


(AST/SGOT) 26 U/L 


 


 


Alanine Aminotransferase


(ALT/SGPT) 15 U/L 


 


 


Total Bilirubin 0.5 MG/DL 


 


Sodium Level 132 MEQ/L 


 


Potassium Level 4.0 MEQ/L 


 


Chloride Level 97 MEQ/L 


 


Carbon Dioxide Level 23.1 MEQ/L 


 


Anion Gap 12 MEQ/L 


 


Estimat Glomerular Filtration


Rate 37 ML/MIN 


 


 


Thyroid Stimulating Hormone


3rd Gen 3.400 uIU/ML 


 


 


Urine Opiates Screen NEG 


 


Urine Barbiturates Screen NEG 


 


Urine Amphetamines Screen NEG 


 


Urine Benzodiazepines Screen NEG 


 


Urine Cocaine Screen NEG 


 


Urine Cannabinoids Screen NEG 


 


Ethyl Alcohol Level


  LESS THAN 3


MG/DL











Mental Status Examination


Appearance:  Appropriate


Consciousness:  Alert


Orientation:  x4


Motor Activity:  Normal gait


Speech:  Unremarkable


Language:  Adequate


Fund of Knowledge:  Adequate


Attention and Concentration:  Adequate


Memory:  Unremarkable


Mood:  Sad, Irritable


Affect:  Irritable, Sad


Thought Process & Associations:  Intact


Thought Content:  Appropriate


Hallucination Type:  None


Delusion Type:  Paranoid


Suicidal Ideation:  Yes


Suicidal Plan:  Yes


Suicidal Intention:  No


Homicidal Ideation:  No


Homicidal Plan:  No


Homicidal Intention:  No


Insight:  Poor


Judgment:  Poor





Assessment & Plan


Problem List:  


(1) Unspecified psychosis


ICD Codes:  F29 - Unspecified psychosis not due to a substance or known 

physiological condition


Assessment & Plan:  Psychiatric evaluation today the patient presents quite 

distressed, irritable, restless, tearful throughout the interview.  The patient 

reports that she came here because she has been thinking in committing suicide.

  Patient reports that if she has to go back to her granddaughter house she 

will kill herself.  The patient makes several allegations about her daughter or 

her family conspiring to harm her and accuse her "of being using drugs.  

Patient reports that her granddaughter is using cameras to watch her.  She 

states that even the cameras here in the hospital are watching her behavior and 

communicating with her grand daughter.  She may several accusations of her 

granddaughter stealing her medications, stealing her money, even trying to kill 

her.  In the context of this idea, this seems to be quite delusional, the 

patient reports symptomatology of depression, anxiety, anhedonia, hopelessness, 

helplessness, suicidal ideation, with the plans of overdosing.  There is a 

patient with a psychiatric history of bipolar disorder, alcohol and 

benzodiazepines use disorder, multiple psychiatric hospitalizations, suicide 

attempts.  She also has a significant medical history of multiple chronic 

medical conditions.  At this moment, given the level of distress, highly 

suspected paranoid delusions and his persistent suicidal ideation with a plan 

of overdosing, the patient has an increased risk of danger to self and needs 

psychiatric admission for stabilization.  I will start Seroquel 25 mg twice 

daily for psychosis.  Will restart Lexapro 10 mg for anxiety and depression. 

[facility] protocol for suspected benzodiazepine overuse.  The patient states that 

she has been prescribed with Xanax 2 mg 3 times per day by Dr. Hampton, I will 

start clonazepam 1 mg twice daily to cover her anxiety.  No collateral 

information contacted at this moment.  Will consult psychiatry for second 

opinion, medicine for help with medical conditions.  Patient would be 

transferred to the [facility].





Assessment & Plan


Estimated LOS:  Po White MD May 24, 2018 11:04

## 2018-05-25 VITALS
RESPIRATION RATE: 16 BRPM | TEMPERATURE: 97.8 F | SYSTOLIC BLOOD PRESSURE: 102 MMHG | HEART RATE: 82 BPM | OXYGEN SATURATION: 96 % | DIASTOLIC BLOOD PRESSURE: 57 MMHG

## 2018-05-25 VITALS
TEMPERATURE: 97.7 F | DIASTOLIC BLOOD PRESSURE: 76 MMHG | RESPIRATION RATE: 16 BRPM | HEART RATE: 74 BPM | OXYGEN SATURATION: 94 % | SYSTOLIC BLOOD PRESSURE: 124 MMHG

## 2018-05-25 LAB
BUN SERPL-MCNC: 38 MG/DL (ref 7–18)
CALCIUM SERPL-MCNC: 8.9 MG/DL (ref 8.5–10.1)
CHLORIDE SERPL-SCNC: 99 MEQ/L (ref 98–107)
CHOLEST SERPL-MCNC: 282 MG/DL (ref 120–200)
CHOLESTEROL/ HDL RATIO: 6.23 RATIO
CREAT SERPL-MCNC: 1.87 MG/DL (ref 0.5–1)
GFR SERPLBLD BASED ON 1.73 SQ M-ARVRAT: 27 ML/MIN (ref 89–?)
GLUCOSE SERPL-MCNC: 88 MG/DL (ref 74–106)
HBA1C MFR BLD: 6.1 % (ref 4.3–6)
HCO3 BLD-SCNC: 25.5 MEQ/L (ref 21–32)
HDLC SERPL-MCNC: 45.2 MG/DL (ref 40–60)
LDLC SERPL-MCNC: 211 MG/DL (ref 0–99)
SODIUM SERPL-SCNC: 136 MEQ/L (ref 136–145)
TRIGL SERPL-MCNC: 129 MG/DL (ref 42–150)

## 2018-05-25 RX ADMIN — METOPROLOL TARTRATE SCH MG: 25 TABLET, FILM COATED ORAL at 20:49

## 2018-05-25 RX ADMIN — ACETAMINOPHEN PRN MG: 325 TABLET ORAL at 16:33

## 2018-05-25 RX ADMIN — METOPROLOL TARTRATE SCH MG: 25 TABLET, FILM COATED ORAL at 09:00

## 2018-05-25 RX ADMIN — LISINOPRIL SCH MG: 5 TABLET ORAL at 09:00

## 2018-05-25 RX ADMIN — PACLITAXEL SCH MG: 6 INJECTION, SOLUTION, CONCENTRATE INTRAVENOUS at 09:00

## 2018-05-25 NOTE — PD.CONS
HPI


[facility]


Consult Requested By





Primary Care Physician


No Primary Care Physician


Diagnoses:  


History of Present Illness


Mrs. Carrillo is a 68 year old female.  She was admitted secondary to an 

unspecified psychosis.  Consult is placed in regards to medical evaluation and 

management.  At baseline she has chronic kidney disease stage IV.  She is also 

noted to have mild hyponatremia.  At baseline she has CML and follows with 

oncology of [facility] for this.  No complaints of pain when seen.  

Hypertension is present at baseline but currently under good control.  The 

patient reports that her sodium levels often fluctuate and this may be related 

to her chronic kidney disease.  Chronic kidney disease baseline is 

approximately 1.3 to 1.4 for a creatinine.  She is at baseline in regards to 

renal function, presently.





Review of Systems


Constitutional:  DENIES: Fatigue, Fever, Chills


Cardiovascular:  DENIES: Chest pain, Palpitations, Syncope


Gastrointestinal:  DENIES: Abdominal pain, Black stools, Bloody stools


Musculoskeletal:  DENIES: Joint pain, Muscle aches, Stiffness


Integumentary:  DENIES: Abnormal pigmentation, Pruritus, Rash, Nail changes


Hematologic/lymphatic:  DENIES: Bruising, Lymphadenopathy


Immunologic/allergic:  DENIES: Eczema, Urticaria


Neurologic:  DENIES: Abnormal gait, Headache, Paresthesias


Psychiatric:  DENIES: Depression, Suicidal Ideation


Except as stated in HPI:  all other systems reviewed are Neg





Past Family Social History


Allergies:  


Coded Allergies:  


     azithromycin (Unverified  Allergy, Severe, RASH, 5/24/18)


     buspirone (Unverified  Allergy, Severe, RASH, 5/24/18)


     erythromycin base (Unverified  Allergy, Severe, RASH, 5/24/18)


     penicillin G (Unverified  Allergy, Severe, NAUSEA,VOMITING,RASH, 5/24/18)


     phenelzine (Unverified  Allergy, Severe, RASH,INSOMNIA, 5/24/18)


Past Medical History


CML


Chronic kidney disease stage III


Coronary artery disease


CHF


COPD


Hyperlipidemia


Anxiety


Depression


Hypertension


History of old MI


History of seizure


Sleep apnea


Chronic anemia


Osteoarthritis


Chronic pain syndrome


Past Surgical History





Right shoulder surgery


Left hip replacement surgery


Reported Medications





Reported Meds & Active Scripts


Active


Reported


Simvastatin 5 Mg Tab 5 Mg PO DAILY


Lisinopril 5 Mg Tab 5 Mg PO DAILY


Metoprolol Tartrate 25 Mg Tab 25 Mg PO BID


Active Ordered Medications





Administered Medications








 Medications


  (Trade)  Dose


 Ordered  Sig/Randall


 Route


 PRN Reason  Start Time


 Stop Time Status Last Admin


Dose Admin


 


 Lisinopril


  (Prinivil)  5 mg  DAILY


 PO


   5/24/18 10:00


    5/24/18 13:00


 


 


 Metoprolol


 Tartrate


  (Lopressor)  25 mg  BID


 PO


   5/24/18 21:00


    5/24/18 20:52


 


 


 Quetiapine


 Fumarate


  (SEROquel)  25 mg  BID@09,12


 PO


   5/24/18 12:00


    5/24/18 13:00


 


 


 Clonazepam


  (KlonoPIN)  0.5 mg  Q8HR


 PO


   5/24/18 14:00


    5/25/18 05:53


 








Family History


Patient reports only psychiatric conditions, alcoholism, and dependency 

problems in her family history


Social History


No smoking


No alcohol abuse


No illicit drug abuse





Physical Exam


Vital Signs





Vital Signs








  Date Time  Temp Pulse Resp B/P (MAP) Pulse Ox O2 Delivery O2 Flow Rate FiO2


 


5/25/18 05:53 97.7 74 16 124/76 (92) 94   


 


5/24/18 18:25 99.1 97 18 118/61 (80) 94   


 


5/24/18 11:30 99.3 83 18 157/119 (132) 97   


 


5/24/18 11:26        


 


5/24/18 11:00  86 14 139/82 (101) 97 Room Air  








Physical Exam


GENERAL: NAD, A&Ox3


HEAD: Normocephalic. 


NECK: Supple, trachea midline. No lymphadenopathy.


EYES: No scleral icterus. No injection or drainage. 


CARDIOVASCULAR: Regular rate and rhythm without murmurs, gallops, or rubs. 


RESPIRATORY: Breath sounds equal bilaterally. No accessory muscle use.


GASTROINTESTINAL: Abdomen soft, non-tender, nondistended. 


MUSCULOSKELETAL: No cyanosis, or edema. 


SKIN: Warm and dry.


NEURO:  No focal neurological deficitis.


Result Diagram:  


5/24/18 0207                                                                   

             5/24/18 0207








Assessment and Plan


Problem List:  


(1) Unspecified psychosis


ICD Code:  F29 - Unspecified psychosis not due to a substance or known 

physiological condition


(2) HTN (hypertension)


ICD Code:  I10 - HTN (hypertension)


Status:  Acute


(3) CKD (chronic kidney disease)


ICD Code:  N18.9 - CKD (chronic kidney disease)


Status:  Acute


Assessment and Plan


68-year-old female admitted secondary to unspecified psychosis





Chronic kidney disease stage III/IV


Follow renal function for now


Present creatinine levels are approximating her previous baseline





Hyponatremia


No acute treatment needed


Reevaluate sodium level tomorrow morning





CML


Chronic anemia


Follow with oncology as an outpatient


No evidence of acute exacerbation





Hyperlipidemia


Continue present treatment


Follow as an outpatient





Hypertension


Continue baseline treatment


Continue metoprolol


Continue lisinopril


Follow blood pressures


Adjust treatments as needed





Anxiety


Depression


Psychosis


Management per psychiatry





History of seizure


Sleep apnea


Osteoarthritis


Chronic pain syndrome


Coronary artery disease


Old MI


CHF


COPD


No exacerbations


Asymptomatic


No plan changes to baseline treatments





DVT prophylaxis


Ambulation with physical therapy





Problem Qualifiers





(1) CKD (chronic kidney disease):  


Qualified Codes:  N18.9 - Chronic kidney disease, unspecified








Ameya Ahumada MD May 25, 2018 10:02

## 2018-05-25 NOTE — PD.PSY.CON
Provisional Diagnosis


Admission Date


May 24, 2018 at 10:07


Stanley I.


Unspecified psychosis.





History of Present Illness


Service


Psychiatry


Consult Requested By


Dr. Milner


Reason for Consult


Second opinion


Primary Care Physician


No Primary Care Physician


HPI


The patient 68-year-old  woman, domiciled with her granddaughter in 

[facility], , mother of 3 kids, supported by Social Security, psychiatric 

history of alcohol and benzodiazepine use disorder, bipolar disorder, anxiety, 

psychiatric admissions, one suicidal attempt by overdosing, outpatient 

psychiatric care with Dr. Hampton, she is in Lexapro 20 mg, Xanax 2 mg 3 times 

daily, medical history of CML, CAD, CHF, dyslipidemia, hypertension COPD, who 

presents emergency department under [place] act by PD.  Patient had contacted 

police advising them that she was feeling suicidal.  The patient states that 

she merely wanted to get out of the house.  She has no plan on self-harm.  No 

homicidal ideation.  She states that she lives with her granddaughter.  She had 

moved back to the area from [place] after living with her son.  She states 

that she is currently under the care of the cancer doctor at [facility].  

She is being treated for CML.  Patient also has a history of.  Anemia arthritis 

chronic pain syndrome morphine overdose CAD dyslipidemia CHF COPD anxiety 

depression hypertension renal insufficiency myocardial infarction seizure sleep 

apnea D&C tubal ligation .patient denies any toxic ingestions.  EMR was 

reviewed.  Collateral information from her granddaughter Brittany Cruz [phone], was attempted, but unfortunately not obtained at this moment.  Case 

was discussed with ER team.  On psychiatric evaluation the patient is found in 

distress, very anxious stating that she feels scared.  Patient states that if 

you has to go back to what she is living now with her granddaughter she is 

going to kill herself.  She reports that her daughter has been stealing her 

money, stealing her Xanax and talking against her with her family.  The patient 

is very tearful during the evaluation, stating that all her family is now a 

conspiracy "to get me out of my house, to maybe look like a drug addict".  She 

says that her family are recording or her movement with cameras, and as the 

patient states these, she started looking around the room "I know that our, or 

is here, they are recording my behavior to accuse me".  The patient reports 

that her daughter has been stealing her Xanax " and them she comes to me 

accusing me that I am not overusing this medication".  The patient repeats 

multiple times that she prefers to kill herself then going back to her 

granddaughter's house per.  Patient reports symptoms of depression, anxiety, 

she says that she has been feeling restless, with multiple intrusive thoughts 

of her family talking about her, helplessness, hopelessness, suicidal ideation, 

with a plan of overdosing, denies homicidal ideation, she denies visual and 

auditory hallucinations.  Patient is fully oriented 3.  No attention deficit, 

no fluctuation of consciousness.  The patient denies the use of alcohol.  She 

reports that she was an alcoholic in the past but she has being over a year 

sober.  She denies the use of illegal drugs. 





05/25/18 -second opinion 


Patient is a 68-year-old  woman, domiciled with granddaughter, 

, has 3 adult children, unemployed on SSI, with a past psychiatric history of 

bipolar disorder, alcohol and benzodiazepine use disorder, anxiety disorder, 

multiple psychiatric admissions, one previous suicide attempt, with a past 

medical history significant for CML, CAD, CHF, HLD, HTN, COPD, seizure disorder

, MI, who was brought in under [place] act by police due to suicide ideation 

which patient was admitted to the inpatient psychiatry for further evaluation 

and management.  Patient was found lying hospital bed noted B, cooperative.  

Patient states that she is feeling somewhat better and she is "out of the 

situation" referring to her living with her granddaughter at this time.  

Patient states that she has been having difficulty every time she goes back to 

live with her and this is the fourth time.  Patient mentions that she was 

following with Dr. Cordoba oncology clinic last seen a couple months ago after her 

grandmother had told her that she was dismissed from the clinic although she 

cannot receiving letters requesting communication with the clinic as she was 

not attending her appointments.  Patient reports having had decreased sleep, 

appetite, energy and concentration along with feeling depressed but denying any 

suicide ideations at this time.  Patient reports feeling "good" but continues 

to feel worried about her finances as her granddaughter has access to her bank 

accounts.  Patient endorsing some paranoia regarding her granddaughter and that 

they are stealing her money as well as having endorsed being watched in her 

home as well as here in the hospital.





Past Family Social History


Coded Allergies:  


     azithromycin (Unverified  Allergy, Severe, RASH, 5/24/18)


     buspirone (Unverified  Allergy, Severe, RASH, 5/24/18)


     erythromycin base (Unverified  Allergy, Severe, RASH, 5/24/18)


     penicillin G (Unverified  Allergy, Severe, NAUSEA,VOMITING,RASH, 5/24/18)


     phenelzine (Unverified  Allergy, Severe, RASH,INSOMNIA, 5/24/18)


Reported Medications


Simvastatin (Simvastatin) 5 Mg Tab, 5 MG PO DAILY for Cholesterol Management, #

30 TAB 0 Refills


   5/24/18


Lisinopril (Lisinopril) 5 Mg Tab, 5 MG PO DAILY for Blood Pressure Management, #

30 TAB 0 Refills


   5/24/18


Metoprolol Tartrate (Metoprolol Tartrate) 25 Mg Tab, 25 MG PO BID, #60 TAB 0 

Refills


   5/24/18


Discontinued Reported Medications


Simvastatin (Zocor 40 mg) 40 Mg Tab, 1 TAB PO HS, TAB


   1/22/15


Furosemide (Lasix 20 Mg  Tab) 20 Mg Tab, 20 MG PO DAILY, TAB


   1/22/15


Lisinopril 10 mg (Lisinopril 10 mg) 10 Mg Tab, 1 TAB PO DAILY, TAB


   1/22/15


Lamotrigine (Lamictal) Unknown Strength Tab, PO DAILY, TAB


   1/22/15


Metoprolol Tartrate (Lopressor) 50 Mg Tab, 50 MG PO BID, #60 TAB


   10/4/13


Clonazepam (Clonazepam) 1 Mg Tab, 1 MG PO QID for ANXIETY, #60 TAB


   10/4/13


Discontinued Scripts


Ranitidine HCl (Zantac 150 Mg Tab) 150 Mg Tab, 150 MG PO BID for GERD, #60 TAB


   Prov:David,Meme A, DO         2/14/15


Escitalopram Oxalate (Lexapro) 10 Mg Tab, 10 MG PO DAILY for LYNNE, #30 TAB


   Prov:David,Meme A, DO         2/14/15


Temazepam 15 mg (Restoril 15 mg) 15 Mg Cap, 15 MG PO HS Y for insomnia, #30 CAP


   Prov:DavidMeme elias, DO         2/13/15


Nitroglycerin (Nitroglycerin Tab 0.4 Mg Sl) 0.4 Mg Subl, 0.4 MG SL UNSCH Y for 

CHEST PAIN, #50 TAB


   Prov:Meme Hernandez, DO         2/13/15


Morphine Sulfate (Morphine Sulfate IR 15 mg) 15 Mg Tab, 15 MG PO Q12H for PAIN, 

#60 TAB


   Prov:Meme Hernandez, DO         2/13/15


Calcium Carbonate (Calcium Carbonate) 500 Mg Chew, 500 MG CHEW Q12 for LOW JULIA, 

#60 TAB


   Prov:Meme Hernandez, DO         2/13/15


Calcitriol (Rocaltrol) 0.25 Mcg Cap, 0.25 MCG PO DAILY for LOW JULIA, #30 CAP


   Prov:Meme Hernandez, DO         2/13/15


Albuterol/Ipratropium (Resp: Albuterol/Ipratropium 2.5 Mg/0.5 Mg) 1 Amp Nebu, 1 

AMP INH Q4-RT Y for DYSPNEA, #60 BOX


   Prov:Meme Hernandez, DO         2/13/15





Current Medications








 Medications


  (Trade)  Dose


 Ordered  Sig/Randall


 Route  Start Time


 Stop Time Status Last Admin


 


  (Prinivil)  5 mg  DAILY


 PO  5/24/18 10:00


    5/25/18 09:00


 


 


  (Lopressor)  25 mg  BID


 PO  5/24/18 21:00


    5/25/18 09:00


 


 


  (Pravachol)  10 mg  DAILY


 PO  5/24/18 10:00


    5/25/18 09:00


 


 


  (Ativan)  0.5 mg  Q12H  PRN


 PO  5/24/18 10:15


     


 


 


  (Ativan Inj)  0.5 mg  Q12H  PRN


 IM  5/24/18 10:15


     


 


 


  (Tylenol)  650 mg  Q4H  PRN


 PO  5/24/18 10:15


     


 


 


  (Milk Of


 Magnesia Liq)  30 ml  DAILY  PRN


 PO  5/24/18 10:15


     


 


 


  (Mag-Al Plus


 Susp Liq)  30 ml  Q6H  PRN


 PO  5/24/18 10:15


     


 


 


  (Romazicon Inj)  0.2 mg  Q1M  PRN


 IV PUSH  5/24/18 10:15


     


 


 


  (Ativan)  1 mg  Q4H  PRN


 PO  5/24/18 10:15


     


 


 


  (Ativan Inj)  1 mg  Q4H  PRN


 IV PUSH  5/24/18 10:15


     


 


 


  (Ativan)  2 mg  Q2H  PRN


 PO  5/24/18 10:15


     


 


 


  (Ativan Inj)  2 mg  Q2H  PRN


 IV PUSH  5/24/18 10:15


     


 


 


  (Ativan Inj)  2 mg  Q1H  PRN


 IV PUSH  5/24/18 10:15


     


 


 


  (Ativan Inj)  2 mg  Q15M  PRN


 IV PUSH  5/24/18 10:15


     


 


 


  (SEROquel)  25 mg  BID@09,12


 PO  5/24/18 12:00


    5/25/18 12:00


 


 


  (KlonoPIN)  0.5 mg  Q8HR


 PO  5/24/18 14:00


    5/25/18 05:53


 








Patient's Strengths (min. 2)


Outpatient psychiatric





Physical Exam


Vital Signs





Vital Signs








  Date Time  Temp Pulse Resp B/P (MAP) Pulse Ox O2 Delivery O2 Flow Rate FiO2


 


5/25/18 05:53 97.7 74 16 124/76 (92) 94   


 


5/24/18 11:00      Room Air  














I/O   


 


 5/25/18 5/25/18 5/26/18





 08:00 16:00 00:00


 


Intake Total  600 ml 


 


Balance  600 ml 








Lab Results











Test


  5/25/18


10:10


 


Blood Urea Nitrogen 38 MG/DL 


 


Creatinine 1.87 MG/DL 


 


Random Glucose 88 MG/DL 


 


Calcium Level 8.9 MG/DL 


 


Sodium Level 136 MEQ/L 


 


Potassium Level 4.5 MEQ/L 


 


Chloride Level 99 MEQ/L 


 


Carbon Dioxide Level 25.5 MEQ/L 


 


Anion Gap 12 MEQ/L 


 


Estimat Glomerular Filtration


Rate 27 ML/MIN 


 


 


Hemoglobin A1c 6.1 % 


 


Triglycerides Level 129 MG/DL 


 


Cholesterol Level 282 MG/DL 


 


LDL Cholesterol 211 MG/DL 


 


HDL Cholesterol 45.2 MG/DL 


 


Cholesterol/HDL Ratio 6.23 RATIO 











Mental Status Examination


Appearance:  Appropriate


Consciousness:  Alert


Orientation:  x4


Motor Activity:  Normal gait


Speech:  Unremarkable


Language:  Adequate


Fund of Knowledge:  Adequate


Attention and Concentration:  Adequate


Memory:  Unremarkable


Mood:  Sad, Irritable


Affect:  Irritable, Sad


Thought Process & Associations:  Intact


Thought Content:  Appropriate


Hallucination Type:  None


Delusion Type:  Paranoid


Suicidal Ideation:  Yes (denies at this time)


Suicidal Plan:  No


Suicidal Intention:  No


Homicidal Ideation:  No


Homicidal Plan:  No


Homicidal Intention:  No


Insight:  Poor


Judgment:  Poor





Assessment & Plan


Problem List:  


(1) Unspecified psychosis


ICD Codes:  F29 - Unspecified psychosis not due to a substance or known 

physiological condition


Assessment & Plan


I have seen and examined this patient, reviewed the documentation, discussed 

personally with Dr. Milner, and I agree and concur with his assessment and 

plan.  Consult appreciated.


Patient at this time noted with paranoia against the granddaughter along with 

recent suicidal ideations in the context of relational discord living with her 

granddaughter.  Patient to continue quetiapine 25 mg p.o. twice daily, 

clonazepam 1 mg p.o. twice daily, Lexapro 10 mg p.o. daily and will continue on 

CIWA protocol due to history of benzo and alcohol use disorder.  Continue 

monitor mood and behavior.  Discharge planning in progress.


Discharge Planning


To be determined.











Daniel Lundy MD May 25, 2018 16:30

## 2018-05-26 VITALS
HEART RATE: 88 BPM | SYSTOLIC BLOOD PRESSURE: 114 MMHG | OXYGEN SATURATION: 97 % | DIASTOLIC BLOOD PRESSURE: 59 MMHG | TEMPERATURE: 98.1 F | RESPIRATION RATE: 16 BRPM

## 2018-05-26 VITALS
SYSTOLIC BLOOD PRESSURE: 129 MMHG | DIASTOLIC BLOOD PRESSURE: 60 MMHG | OXYGEN SATURATION: 97 % | HEART RATE: 55 BPM | RESPIRATION RATE: 17 BRPM | TEMPERATURE: 98.3 F

## 2018-05-26 VITALS — HEART RATE: 90 BPM

## 2018-05-26 RX ADMIN — LISINOPRIL SCH MG: 5 TABLET ORAL at 09:01

## 2018-05-26 RX ADMIN — METOPROLOL TARTRATE SCH MG: 25 TABLET, FILM COATED ORAL at 09:03

## 2018-05-26 RX ADMIN — PACLITAXEL SCH MG: 6 INJECTION, SOLUTION, CONCENTRATE INTRAVENOUS at 09:03

## 2018-05-26 RX ADMIN — ACETAMINOPHEN PRN MG: 325 TABLET ORAL at 12:01

## 2018-05-26 RX ADMIN — METOPROLOL TARTRATE SCH MG: 25 TABLET, FILM COATED ORAL at 21:00

## 2018-05-26 NOTE — HHI.PYPN
Subjective


Remarks


The patient was seen today for psychiatric reevaluation.  Case discussed with 

nurses in charge.  On the evaluation the patient is calm, cooperative, quite 

focused no going back home "because my daughter-in-law has been stealing my 

medications and she is connected with drug dealer".  Patient clarifies that if 

she has to go back home to live with her family she prefers to kill herself.





Mental Status Examination


Appearance:  Appropriate


Consciousness:  Alert


Orientation:  x4


Motor Activity:  Normal gait


Speech:  Unremarkable


Language:  Adequate


Fund of Knowledge:  Adequate


Attention and Concentration:  Adequate


Memory:  Unremarkable


Mood:  Sad, Irritable


Affect:  Irritable, Sad


Thought Process & Associations:  Intact


Thought Content:  Appropriate


Hallucination Type:  None


Delusion Type:  Paranoid


Suicidal Ideation:  Yes (denies at this time)


Suicidal Plan:  No


Suicidal Intention:  No


Homicidal Ideation:  No


Homicidal Plan:  No


Homicidal Intention:  No


Insight:  Poor


Judgment:  Poor





Results


Vitals/IOs





Vital Signs








  Date Time  Temp Pulse Resp B/P (MAP) Pulse Ox O2 Delivery O2 Flow Rate FiO2


 


5/26/18 06:00 98.3 55 17 129/60 (83) 97   


 


5/24/18 11:00      Room Air  














Intake and Output   


 


 5/26/18 5/26/18 5/27/18





 08:00 16:00 00:00


 


Intake Total 240 ml 840 ml 


 


Balance 240 ml 840 ml 











Assessment & Plan


Problem List:  


(1) Unspecified psychosis


ICD Codes:  F29 - Unspecified psychosis not due to a substance or known 

physiological condition


Assessment & Plan:  Patient continues to shows paranoid delusions.  Continue 

current psychotropic regimen





Assessment & Plan


Estimated LOS:  days


Justification for Cont. Inpt.


Patient continues to show delusional paranoia











Po Milner MD May 26, 2018 15:52

## 2018-05-27 VITALS
OXYGEN SATURATION: 96 % | TEMPERATURE: 98.8 F | HEART RATE: 70 BPM | SYSTOLIC BLOOD PRESSURE: 123 MMHG | RESPIRATION RATE: 18 BRPM | DIASTOLIC BLOOD PRESSURE: 60 MMHG

## 2018-05-27 VITALS
OXYGEN SATURATION: 99 % | HEART RATE: 73 BPM | RESPIRATION RATE: 17 BRPM | DIASTOLIC BLOOD PRESSURE: 70 MMHG | SYSTOLIC BLOOD PRESSURE: 141 MMHG | TEMPERATURE: 98.8 F

## 2018-05-27 LAB
BUN SERPL-MCNC: 33 MG/DL (ref 7–18)
CALCIUM SERPL-MCNC: 9.1 MG/DL (ref 8.5–10.1)
CHLORIDE SERPL-SCNC: 104 MEQ/L (ref 98–107)
CREAT SERPL-MCNC: 1.21 MG/DL (ref 0.5–1)
GFR SERPLBLD BASED ON 1.73 SQ M-ARVRAT: 44 ML/MIN (ref 89–?)
GLUCOSE SERPL-MCNC: 106 MG/DL (ref 74–106)
HCO3 BLD-SCNC: 24 MEQ/L (ref 21–32)
SODIUM SERPL-SCNC: 138 MEQ/L (ref 136–145)

## 2018-05-27 RX ADMIN — FLUTICASONE PROPIONATE SCH SPRAY: 50 SPRAY, METERED NASAL at 14:30

## 2018-05-27 RX ADMIN — METOPROLOL TARTRATE SCH MG: 25 TABLET, FILM COATED ORAL at 09:19

## 2018-05-27 RX ADMIN — LISINOPRIL SCH MG: 5 TABLET ORAL at 09:18

## 2018-05-27 RX ADMIN — FLUTICASONE PROPIONATE SCH SPRAY: 50 SPRAY, METERED NASAL at 21:00

## 2018-05-27 RX ADMIN — METOPROLOL TARTRATE SCH MG: 25 TABLET, FILM COATED ORAL at 21:38

## 2018-05-27 RX ADMIN — PRAVASTATIN SODIUM SCH MG: 40 TABLET ORAL at 09:19

## 2018-05-27 NOTE — HHI.PYPN
Subjective


Remarks


Patient seen and examined with nurse in weekend coverage.  Chart reviewed.  

Case discussed with nursing staff who reports patient is fixated on her anxiety 

and insisting staff perform even menial tasks for her such as brushing her hair 

because of her anxiety.  On my examination today, the patient speaks 

extensively about her benzodiazepines and opiates.  She says that she was 

taking Xanax 2 mg 3 times a day at home as well as an opiate pain medication, 

but her granddaughter's boyfriend has been selling these medications.  She 

alleges that she has been exploited by granddaughter, and nurse confirms for me 

that [facility] has already been involved in this matter.  She denies any suicidal or 

homicidal ideation.  She says that she just threatened suicide prior to 

admission to get the attention of authorities.  No hand tremor, no mydriasis, 

no signs of GABAergic withdrawal.  No side effects from medications.  No 

physical complaints.





Review of Systems


Except as stated in HPI:  all other systems reviewed are Neg





Mental Status Examination


Appearance:  Appropriate


Consciousness:  Alert


Orientation:  x4


Motor Activity:  Other (No motor abnormalities noted)


Speech:  Unremarkable


Language:  Adequate


Fund of Knowledge:  Adequate


Attention and Concentration:  Adequate


Memory:  Unremarkable (Grossly intact on clinical exam)


Mood:  Anxious


Affect:  Anxious


Thought Process & Associations:  Intact


Thought Content:  Appropriate


Hallucination Type:  None


Delusion Type:  Paranoid


Suicidal Ideation:  No


Suicidal Plan:  No


Suicidal Intention:  No


Homicidal Ideation:  No


Homicidal Plan:  No


Homicidal Intention:  No


Insight:  Poor


Judgment:  Poor





Results


Labs


Labs reviewed.  Decreased GFR noted.  BMP for today is pending.


Vitals/IOs





Vital Signs








  Date Time  Temp Pulse Resp B/P (MAP) Pulse Ox O2 Delivery O2 Flow Rate FiO2


 


5/27/18 06:21 98.8 73 17 141/70 (93) 99   


 


5/24/18 11:00      Room Air  














Intake and Output   


 


 5/27/18 5/27/18 5/28/18





 08:00 16:00 00:00


 


Intake Total 0 ml  


 


Balance 0 ml  











Assessment & Plan


Problem List:  


(1) Unspecified psychosis


ICD Codes:  F29 - Unspecified psychosis not due to a substance or known 

physiological condition


Assessment & Plan


Continue current psychotropics as ordered.  Continue to monitor on the 

inpatient unit.  Continue other medications and care as ordered.


Justification for Cont. Inpt.


Risk for decompensation


Discharge Planning


Per primary psychiatrist











Ata Velazquez MD May 27, 2018 08:50

## 2018-05-27 NOTE — HHI.PR
Subjective


Remarks


Follow-up visit chronic anemia, CML, chronic kidney disease stage III-IV, HLD, 

HTN.  Patient seen and examined today laying in bed.  Reports nasal congestion 

and requesting for Afrin.  States she is depressed and is very stressed out of 

her living situations.  She is trying to get that you have sake through either 

skilled nursing facility or assisted living facility to help out.  Denies pain 

and discomfort.  Denies SOB/ dyspnea.  Denies chest pain, palpitations, 

headaches, dizziness. Denies fevers, chills, n/v/d.  Denies dysuria.





Objective


Vitals





Vital Signs








  Date Time  Temp Pulse Resp B/P (MAP) Pulse Ox O2 Delivery O2 Flow Rate FiO2


 


5/27/18 06:21 98.8 73 17 141/70 (93) 99   


 


5/26/18 18:43 98.1 88 16 114/59 (77) 97   














I/O      


 


 5/26/18 5/26/18 5/26/18 5/27/18 5/27/18 5/27/18





 07:00 15:00 23:00 07:00 15:00 23:00


 


Intake Total 240 ml 840 ml 1800 ml 0 ml  


 


Balance 240 ml 840 ml 1800 ml 0 ml  


 


      


 


Intake Oral 240 ml 840 ml 1800 ml 0 ml  


 


# Voids 2  1 2  








Result Diagram:  


5/24/18 0207                                                                   

             5/27/18 0750





Objective Remarks


GENERAL: This is a well-nourished, well-developed patient, in no apparent 

distress.


SKIN: Warm and dry


HEENT: Normocephalic. Pupils equal round and reactive. Nose without bleeding. 

Airway patent.


NECK: Trachea midline. 


CARDIOVASCULAR: Regular rate and rhythm without murmurs, gallops, or rubs. 


RESPIRATORY: No wheezes, rales, or rhonchi.  Diminished bases


GASTROINTESTINAL: Abdomen soft, non-tender, nondistended. Bowel Sounds 

normoactive x4.


MUSCULOSKELETAL: Extremities without clubbing, cyanosis, or edema.


NEUROLOGICAL: Awake and alert. Oriented to place, person. No focal neuro 

deficit.  Moves all extremities. Normal speech.





A/P


Problem List:  


(1) Unspecified psychosis


ICD Code:  F29 - Unspecified psychosis not due to a substance or known 

physiological condition


(2) HTN (hypertension)


ICD Code:  I10 - HTN (hypertension)


Status:  Acute


(3) CKD (chronic kidney disease)


ICD Code:  N18.9 - CKD (chronic kidney disease)


Status:  Acute


Assessment and Plan


Patient is a 68 year old female was admitted to the hospital for unspecified 

psychosis.  She is now admitted medical psychiatric unit for further 

evaluation.  Consulted for assistance with medical management.





Anxiety


Depression


Psychosis


   -Management per psychiatry








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


   -Creatinine levels are approximating her previous baseline


   -Avoid nephrotoxins.  Encourage p.o. fluid hydration


   -Improved





Hyponatremia


   -No acute treatment needed


   -Resolved





CML


Chronic anemia


   -Follow with oncology as an outpatient


   -No evidence of acute exacerbation





Hyperlipidemia


Hypertension


   -Continue lisinopril 5 mg daily, continue metoprolol 25 mg twice daily, 

increased dose of pravastatin 40 mg daily ASCVD scores recommends moderate to 

high intensity statin


   -Monitor BP trend.





Borderline diabetes


   -Discussed extensively with patient results of hemoglobin A1c


   -Recommended lifestyle changes including diet and exercise


   -Follow-up with outpatient





Nasal congestion, seasonal allergy


   -Flonase nasal spray





History of seizure


Sleep apnea


Osteoarthritis


Chronic pain syndrome


Coronary artery disease


Old MI


CHF


COPD


   -No exacerbations


   -Asymptomatic


   -No plan changes to baseline treatments





DVT prophylaxis


   -Ambulation with physical therapy





Stable from Hospitalist standpoint.  We will sign off.  Reconsult as needed.





Problem Qualifiers





(1) CKD (chronic kidney disease):  


Qualified Codes:  N18.9 - Chronic kidney disease, unspecified








Antonia Hewitt May 27, 2018 13:22

## 2018-05-28 VITALS
HEART RATE: 90 BPM | TEMPERATURE: 98.8 F | DIASTOLIC BLOOD PRESSURE: 65 MMHG | OXYGEN SATURATION: 94 % | SYSTOLIC BLOOD PRESSURE: 108 MMHG | RESPIRATION RATE: 17 BRPM

## 2018-05-28 VITALS
SYSTOLIC BLOOD PRESSURE: 138 MMHG | HEART RATE: 68 BPM | RESPIRATION RATE: 15 BRPM | DIASTOLIC BLOOD PRESSURE: 67 MMHG | TEMPERATURE: 98.5 F | OXYGEN SATURATION: 96 %

## 2018-05-28 RX ADMIN — PRAVASTATIN SODIUM SCH MG: 40 TABLET ORAL at 08:29

## 2018-05-28 RX ADMIN — FLUTICASONE PROPIONATE SCH SPRAY: 50 SPRAY, METERED NASAL at 08:26

## 2018-05-28 RX ADMIN — FLUTICASONE PROPIONATE SCH SPRAY: 50 SPRAY, METERED NASAL at 20:59

## 2018-05-28 RX ADMIN — LISINOPRIL SCH MG: 5 TABLET ORAL at 08:29

## 2018-05-28 RX ADMIN — METOPROLOL TARTRATE SCH MG: 25 TABLET, FILM COATED ORAL at 20:59

## 2018-05-28 RX ADMIN — ACETAMINOPHEN PRN MG: 325 TABLET ORAL at 18:23

## 2018-05-28 RX ADMIN — METOPROLOL TARTRATE SCH MG: 25 TABLET, FILM COATED ORAL at 08:29

## 2018-05-28 NOTE — HHI.PYPN
Subjective


Remarks


Patient seen for follow, chart reviewed.  Discussion wishes to reported the 

patient noted to be very anxious and focused on granddaughter taking her money 

and her medications feeling unsafe there.  Patient was found lying hospital bed 

noted B, cooperative.  Patient states that she is feeling very anxious due to 

worry about DCF being involved in regards to her granddaughter taking her money 

and medications.  Patient states that her weekend went "okay" but currently 

feeling good denying any suicide ideations stating that she wants to live for 

her grandchildren.  Patient states that she is amenable to being referred to an 

assisted living facility and will follow with her outpatient oncologist after 

discharge.





Review of Systems


Except as stated in HPI:  all other systems reviewed are Neg





Mental Status Examination


Appearance:  Appropriate


Consciousness:  Alert


Orientation:  x4


Motor Activity:  Other (No motor abnormalities noted)


Speech:  Unremarkable


Language:  Adequate


Fund of Knowledge:  Adequate


Attention and Concentration:  Adequate


Memory:  Unremarkable (Grossly intact on clinical exam)


Mood:  Anxious


Affect:  Anxious


Thought Process & Associations:  Intact


Thought Content:  Appropriate


Hallucination Type:  None


Delusion Type:  Paranoid


Suicidal Ideation:  No


Suicidal Plan:  No


Suicidal Intention:  No


Homicidal Ideation:  No


Homicidal Plan:  No


Homicidal Intention:  No


Insight:  Poor


Judgment:  Poor





Results


Vitals/IOs





Vital Signs








  Date Time  Temp Pulse Resp B/P (MAP) Pulse Ox O2 Delivery O2 Flow Rate FiO2


 


5/28/18 06:26 98.5 68 15 138/67 (90) 96   


 


5/24/18 11:00      Room Air  














Intake and Output   


 


 5/28/18 5/28/18 5/29/18





 08:00 16:00 00:00


 


Intake Total  480 ml 


 


Balance  480 ml 











Assessment & Plan


Problem List:  


(1) Unspecified psychosis


ICD Codes:  F29 - Unspecified psychosis not due to a substance or known 

physiological condition


Assessment & Plan


Patient this time continues to feel anxious and perseverated about her 

granddaughter taking her money her medications and looking forward to be 

referred to an assisted living facility when one is acquired.  Patient states 

she feels unsafe returning back to that home with her granddaughter.  We will 

continue current treatment.  Will continue monitor mood and behavior.  DCF is 

involved to investigate patient's reports.  Discharge planning in progress.


Justification for Cont. Inpt.


At risk for further decompensation if at lower level of care


Discharge Planning


Patient to be referred to an assisted living facility once one is acquired.











Daniel Lundy MD May 28, 2018 09:15

## 2018-05-28 NOTE — HHI.PR
Subjective


Remarks


No new complaints at this time


Denies any shortness of breath, denies any chest pain, denies any dysuria, 

denies any abdominal pain, patient is more concerned about her living situation


DCF is involved now





Objective


Vitals





Vital Signs








  Date Time  Temp Pulse Resp B/P (MAP) Pulse Ox O2 Delivery O2 Flow Rate FiO2


 


5/28/18 06:26 98.5 68 15 138/67 (90) 96   


 


5/27/18 18:08 98.8 70 18 123/60 (81) 96   














I/O      


 


 5/27/18 5/27/18 5/27/18 5/28/18 5/28/18 5/28/18





 07:00 15:00 23:00 07:00 15:00 23:00


 


Intake Total 0 ml  720 ml   


 


Balance 0 ml  720 ml   


 


      


 


Intake Oral 0 ml  720 ml   


 


# Voids 2  2 3  








Result Diagram:  


5/24/18 0207                                                                   

             5/27/18 0750





Other Results





 Laboratory Tests








Test


  5/25/18


10:10 5/27/18


07:50


 


Blood Urea Nitrogen 38 MG/DL  33 MG/DL 


 


Creatinine 1.87 MG/DL  1.21 MG/DL 


 


Random Glucose 88 MG/DL  106 MG/DL 


 


Calcium Level 8.9 MG/DL  9.1 MG/DL 


 


Sodium Level 136 MEQ/L  138 MEQ/L 


 


Potassium Level 4.5 MEQ/L  4.6 MEQ/L 


 


Chloride Level 99 MEQ/L  104 MEQ/L 


 


Carbon Dioxide Level 25.5 MEQ/L  24.0 MEQ/L 


 


Anion Gap 12 MEQ/L  10 MEQ/L 


 


Estimat Glomerular Filtration


Rate 27 ML/MIN 


  44 ML/MIN 


 


 


Hemoglobin A1c 6.1 %  


 


Triglycerides Level 129 MG/DL  


 


Cholesterol Level 282 MG/DL  


 


LDL Cholesterol 211 MG/DL  


 


HDL Cholesterol 45.2 MG/DL  


 


Cholesterol/HDL Ratio 6.23 RATIO  








Objective Remarks


GENERAL: Awake and alert talkative and cooperative


SKIN: Warm and dry.


HEAD: Atraumatic. Normocephalic. 


EYES: Pupils equal and round. No scleral icterus. No injection or drainage.  

Extraocular muscles intact


ENT: No nasal bleeding or discharge.  Mucous membranes pink and moist.  Tongue 

is midline


NECK: Trachea midline. No JVD.  Supple


CARDIOVASCULAR: Regular rate and rhythm.  


RESPIRATORY: No accessory muscle use. Clear to auscultation. Breath sounds 

equal bilaterally. 


GASTROINTESTINAL: Abdomen soft, non-tender, nondistended. Hepatic and splenic 

margins not palpable. 


MUSCULOSKELETAL: Extremities without clubbing, cyanosis, or edema. No obvious 

deformities. 


NEUROLOGICAL: Awake and alert. No obvious cranial nerve deficits.  Motor 

grossly within normal limits. Five out of 5 muscle strength in the arms and 

legs.  Normal speech.


PSYCHIATRIC: INAppropriate mood and affect; insight and judgment ABnormal.


Procedures


None


Medications and IVs





Current Medications


Hydroxyzine Pamoate (Vistaril) 50 mg ONCE  ONCE PO  Last administered on 5/24/ 18at 07:34;  Start 5/24/18 at 05:45;  Stop 5/24/18 at 05:46;  Status DC


Lisinopril (Prinivil) 5 mg DAILY PO  Last administered on 5/28/18at 08:29;  

Start 5/24/18 at 10:00


Metoprolol Tartrate (Lopressor) 25 mg BID PO  Last administered on 5/28/18at 08:

29;  Start 5/24/18 at 21:00


Pravastatin Sodium (Pravachol) 10 mg DAILY PO  Last administered on 5/26/18at 09

:03;  Start 5/24/18 at 10:00;  Stop 5/27/18 at 08:14;  Status DC


Lorazepam (Ativan) 1 mg Q6H  PRN PO MODERATE TO SEVERE ANXIETY;  Start 5/24/18 

at 10:15;  Stop 5/24/18 at 10:15;  Status DC


Lorazepam (Ativan Inj) 1 mg Q6H  PRN IM MODERATE TO SEVERE ANXIETY;  Start 5/24/ 18 at 10:15;  Stop 5/24/18 at 10:15;  Status DC


Lorazepam (Ativan) 0.5 mg Q12H  PRN PO MODERATE TO SEVERE ANXIETY;  Start 5/24/ 18 at 10:15


Lorazepam (Ativan Inj) 0.5 mg Q12H  PRN IM MODERATE TO SEVERE ANXIETY;  Start 5/ 24/18 at 10:15


Acetaminophen (Tylenol) 650 mg Q4H  PRN PO Pain 1-5 or Temp >101F Last 

administered on 5/26/18at 12:01;  Start 5/24/18 at 10:15


Magnesium Hydroxide (Milk Of Magnesia Liq) 30 ml DAILY  PRN PO CONSTIPATION;  

Start 5/24/18 at 10:15


Al Hydrox/Mg Hydrox/Simethicone (Mag-Al Plus Susp Liq) 30 ml Q6H  PRN PO 

DYSPEPSIA;  Start 5/24/18 at 10:15


Nicotine (Habitrol 21 Mg Patch.24 Hr) 1 patch DAILY T-DERMAL ;  Start 5/24/18 

at 10:15;  Stop 5/24/18 at 10:15;  Status DC


Flumazenil (Romazicon Inj) 0.2 mg Q1M  PRN IV PUSH SEE LABEL COMMENTS;  Start 5/ 24/18 at 10:15


Lorazepam (Ativan) 1 mg Q4H  PRN PO CIWA 8 - 10;  Start 5/24/18 at 10:15


Lorazepam (Ativan Inj) 1 mg Q4H  PRN IV PUSH CIWA 8 - 10;  Start 5/24/18 at 10:

15


Lorazepam (Ativan) 2 mg Q2H  PRN PO CIWA 11-14;  Start 5/24/18 at 10:15


Lorazepam (Ativan Inj) 2 mg Q2H  PRN IV PUSH CIWA 11-14;  Start 5/24/18 at 10:15


Lorazepam (Ativan Inj) 2 mg Q1H  PRN IV PUSH CIWA 15-20;  Start 5/24/18 at 10:15


Lorazepam (Ativan Inj) 2 mg Q15M  PRN IV PUSH CIWA > 20;  Start 5/24/18 at 10:15


Quetiapine Fumarate (SEROquel) 25 mg BID@09,12 PO  Last administered on 5/28/ 18at 08:29;  Start 5/24/18 at 12:00


Clonazepam (KlonoPIN) 0.5 mg Q8HR PO  Last administered on 5/28/18at 05:40;  

Start 5/24/18 at 14:00


Pravastatin Sodium (Pravachol) 40 mg DAILY PO  Last administered on 5/28/18at 08

:29;  Start 5/27/18 at 09:00


Fluticasone Propionate (Flonase Eyal Spr) 1 spray BID NASAL ;  Start 5/27/18 at 

14:30





A/P


Problem List:  


(1) Unspecified psychosis


ICD Code:  F29 - Unspecified psychosis not due to a substance or known 

physiological condition


(2) HTN (hypertension)


ICD Code:  I10 - HTN (hypertension)


Status:  Acute


(3) CKD (chronic kidney disease)


ICD Code:  N18.9 - CKD (chronic kidney disease)


Status:  Acute


Assessment and Plan


Patient is a 68 year old female was admitted to the hospital for unspecified 

psychosis.  She is now admitted medical psychiatric unit for further 

evaluation.  Consulted for assistance with medical management.





Anxiety


Depression


Psychosis


   -Management per psychiatry








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


   -Creatinine levels are approximating her previous baseline


   -Avoid nephrotoxins.  Encourage p.o. fluid hydration


   -Improved





Hyponatremia


   -No acute treatment needed


   -Resolved





CML


Chronic anemia


   -Follow with oncology as an outpatient


   -No evidence of acute exacerbation





Hyperlipidemia


Hypertension


   -Continue lisinopril 5 mg daily, continue metoprolol 25 mg twice daily, 

increased dose of pravastatin 40 mg daily ASCVD scores recommends moderate to 

high intensity statin


   -Monitor BP trend.





Borderline diabetes


   -Discussed extensively with patient results of hemoglobin A1c


   -Recommended lifestyle changes including diet and exercise


   -Follow-up with outpatient





Nasal congestion, seasonal allergy


   -Flonase nasal spray





History of seizure


Sleep apnea


Osteoarthritis


Chronic pain syndrome


Coronary artery disease


Old MI


CHF


COPD


   -No exacerbations


   -Asymptomatic


   -No plan changes to baseline treatments





DVT prophylaxis


   -Ambulation with physical therapy





We will sign off again


May return to inpatient psychiatric unit


Stable from medical point of view


Discharge Planning


Okay to transfer to inpatient psychiatry





Problem Qualifiers





(1) CKD (chronic kidney disease):  


Qualified Codes:  N18.9 - Chronic kidney disease, unspecified








Rashid Vargas DO May 28, 2018 08:46

## 2018-05-29 VITALS
TEMPERATURE: 97.8 F | SYSTOLIC BLOOD PRESSURE: 115 MMHG | RESPIRATION RATE: 16 BRPM | OXYGEN SATURATION: 94 % | HEART RATE: 67 BPM | DIASTOLIC BLOOD PRESSURE: 57 MMHG

## 2018-05-29 VITALS
HEART RATE: 68 BPM | RESPIRATION RATE: 16 BRPM | DIASTOLIC BLOOD PRESSURE: 58 MMHG | SYSTOLIC BLOOD PRESSURE: 122 MMHG | TEMPERATURE: 97.4 F | OXYGEN SATURATION: 96 %

## 2018-05-29 RX ADMIN — METOPROLOL TARTRATE SCH MG: 25 TABLET, FILM COATED ORAL at 21:28

## 2018-05-29 RX ADMIN — METOPROLOL TARTRATE SCH MG: 25 TABLET, FILM COATED ORAL at 08:37

## 2018-05-29 RX ADMIN — FLUTICASONE PROPIONATE SCH SPRAY: 50 SPRAY, METERED NASAL at 21:28

## 2018-05-29 RX ADMIN — LISINOPRIL SCH MG: 5 TABLET ORAL at 08:37

## 2018-05-29 RX ADMIN — PRAVASTATIN SODIUM SCH MG: 40 TABLET ORAL at 08:37

## 2018-05-29 RX ADMIN — FLUTICASONE PROPIONATE SCH SPRAY: 50 SPRAY, METERED NASAL at 09:00

## 2018-05-29 RX ADMIN — ACETAMINOPHEN PRN MG: 325 TABLET ORAL at 08:40
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
## 2018-05-30 VITALS
DIASTOLIC BLOOD PRESSURE: 59 MMHG | OXYGEN SATURATION: 98 % | HEART RATE: 70 BPM | TEMPERATURE: 97.2 F | RESPIRATION RATE: 18 BRPM | SYSTOLIC BLOOD PRESSURE: 127 MMHG

## 2018-05-30 VITALS
SYSTOLIC BLOOD PRESSURE: 117 MMHG | TEMPERATURE: 98.3 F | OXYGEN SATURATION: 94 % | RESPIRATION RATE: 18 BRPM | HEART RATE: 87 BPM | DIASTOLIC BLOOD PRESSURE: 59 MMHG

## 2018-05-30 RX ADMIN — FLUTICASONE PROPIONATE SCH SPRAY: 50 SPRAY, METERED NASAL at 21:01

## 2018-05-30 RX ADMIN — PRAVASTATIN SODIUM SCH MG: 40 TABLET ORAL at 09:40

## 2018-05-30 RX ADMIN — FLUTICASONE PROPIONATE SCH SPRAY: 50 SPRAY, METERED NASAL at 09:39

## 2018-05-30 RX ADMIN — METOPROLOL TARTRATE SCH MG: 25 TABLET, FILM COATED ORAL at 09:43

## 2018-05-30 RX ADMIN — METOPROLOL TARTRATE SCH MG: 25 TABLET, FILM COATED ORAL at 21:00

## 2018-05-30 RX ADMIN — LISINOPRIL SCH MG: 5 TABLET ORAL at 09:41

## 2018-05-30 NOTE — HHI.PYPN
Subjective


Remarks


Reviewed electronic medical record and discussed case with staff.  Follow-up 

was conducted in patient's room.  Patient was found lying in bed asleep.  She 

woke easily to verbal stimuli.  She reports that she has been tired.  She 

states that her appetite has been good.  She claims that she is not sleeping 

very well at night.  She denies having any concerns which need addressed today.





Mental Status Examination


Appearance:  Appropriate


Consciousness:  Alert


Orientation:  x4


Motor Activity:  Other (No motor abnormalities noted)


Speech:  Unremarkable


Language:  Adequate


Fund of Knowledge:  Adequate


Attention and Concentration:  Adequate


Memory:  Unremarkable (Grossly intact on clinical exam)


Mood:  Anxious


Affect:  Anxious


Thought Process & Associations:  Intact


Thought Content:  Appropriate


Hallucination Type:  None


Delusion Type:  Paranoid (Less so.)


Suicidal Ideation:  No


Suicidal Plan:  No


Suicidal Intention:  No


Homicidal Ideation:  No


Homicidal Plan:  No


Homicidal Intention:  No


Insight:  Poor


Judgment:  Poor





Results


Vitals/IOs





Vital Signs








  Date Time  Temp Pulse Resp B/P (MAP) Pulse Ox O2 Delivery O2 Flow Rate FiO2


 


5/30/18 06:11 97.2 70 18 127/59 (81) 98   














Intake and Output   


 


 5/30/18 5/30/18 5/30/18





 07:59 15:59 23:59


 


Intake Total 360 ml 240 ml 


 


Balance 360 ml 240 ml 











Assessment & Plan


Problem List:  


(1) Unspecified psychosis


ICD Codes:  F29 - Unspecified psychosis not due to a substance or known 

physiological condition


Assessment & Plan


Estimated LOS: Continue with current treatment plan.  Attending psychiatrist 

will be back tomorrow to evaluate patient.  Days


Justification for Cont. Inpt.


Moving this patient to a lower level of care would likely result in 

decompensation.











Selma Reeves May 30, 2018 16:08

## 2018-05-31 VITALS
DIASTOLIC BLOOD PRESSURE: 60 MMHG | HEART RATE: 75 BPM | SYSTOLIC BLOOD PRESSURE: 110 MMHG | OXYGEN SATURATION: 95 % | RESPIRATION RATE: 16 BRPM | TEMPERATURE: 97.5 F

## 2018-05-31 VITALS
DIASTOLIC BLOOD PRESSURE: 57 MMHG | HEART RATE: 72 BPM | RESPIRATION RATE: 17 BRPM | SYSTOLIC BLOOD PRESSURE: 102 MMHG | OXYGEN SATURATION: 99 % | TEMPERATURE: 98.1 F

## 2018-05-31 RX ADMIN — METOPROLOL TARTRATE SCH MG: 25 TABLET, FILM COATED ORAL at 20:52

## 2018-05-31 RX ADMIN — ACETAMINOPHEN PRN MG: 325 TABLET ORAL at 10:14

## 2018-05-31 RX ADMIN — PRAVASTATIN SODIUM SCH MG: 40 TABLET ORAL at 09:48

## 2018-05-31 RX ADMIN — ACETAMINOPHEN PRN MG: 325 TABLET ORAL at 21:52

## 2018-05-31 RX ADMIN — METOPROLOL TARTRATE SCH MG: 25 TABLET, FILM COATED ORAL at 09:49

## 2018-05-31 RX ADMIN — FLUTICASONE PROPIONATE SCH SPRAY: 50 SPRAY, METERED NASAL at 20:52

## 2018-05-31 RX ADMIN — LISINOPRIL SCH MG: 5 TABLET ORAL at 09:49

## 2018-05-31 RX ADMIN — FLUTICASONE PROPIONATE SCH SPRAY: 50 SPRAY, METERED NASAL at 09:48
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
## 2018-06-01 VITALS
TEMPERATURE: 98.1 F | SYSTOLIC BLOOD PRESSURE: 111 MMHG | HEART RATE: 84 BPM | DIASTOLIC BLOOD PRESSURE: 61 MMHG | OXYGEN SATURATION: 95 % | RESPIRATION RATE: 17 BRPM

## 2018-06-01 VITALS
RESPIRATION RATE: 18 BRPM | SYSTOLIC BLOOD PRESSURE: 109 MMHG | HEART RATE: 95 BPM | DIASTOLIC BLOOD PRESSURE: 73 MMHG | TEMPERATURE: 97.5 F

## 2018-06-01 RX ADMIN — METOPROLOL TARTRATE SCH MG: 25 TABLET, FILM COATED ORAL at 10:23

## 2018-06-01 RX ADMIN — LISINOPRIL SCH MG: 5 TABLET ORAL at 10:24

## 2018-06-01 RX ADMIN — PRAVASTATIN SODIUM SCH MG: 40 TABLET ORAL at 10:23

## 2018-06-01 RX ADMIN — FLUTICASONE PROPIONATE SCH SPRAY: 50 SPRAY, METERED NASAL at 20:46

## 2018-06-01 RX ADMIN — ACETAMINOPHEN PRN MG: 325 TABLET ORAL at 15:22

## 2018-06-01 RX ADMIN — FLUTICASONE PROPIONATE SCH SPRAY: 50 SPRAY, METERED NASAL at 10:24

## 2018-06-01 RX ADMIN — METOPROLOL TARTRATE SCH MG: 25 TABLET, FILM COATED ORAL at 20:46

## 2018-06-01 NOTE — HHI.PYPN
Subjective


Remarks


Patient seen for follow-up, chart reviewed.  Discussion with nursing staff 

reported the patient was concerned about her cancer treatment and she has not 

resumed this for quite some time, compliant with medication denying any 

suicidal ideation. Patient reports having adequate appetite, having less 

generalized pain but more notably left knee pain due to history of arthritis.  

Patient states she slept "on and off" if she is worried about where she will be 

discharged to.  Patient denies any SI, HI, AVH or delusions.





Review of Systems


Except as stated in HPI:  all other systems reviewed are Neg





Mental Status Examination


Appearance:  Appropriate


Consciousness:  Alert


Orientation:  x4


Motor Activity:  Other (No motor abnormalities noted)


Speech:  Unremarkable


Language:  Adequate


Fund of Knowledge:  Adequate


Attention and Concentration:  Adequate


Memory:  Unremarkable (Grossly intact on clinical exam)


Mood:  Anxious


Affect:  Anxious (Less so today)


Thought Process & Associations:  Intact


Thought Content:  Appropriate


Hallucination Type:  None


Delusion Type:  Paranoid (Less so.)


Suicidal Ideation:  No


Suicidal Plan:  No


Suicidal Intention:  No


Homicidal Ideation:  No


Homicidal Plan:  No


Homicidal Intention:  No


Insight:  Poor


Judgment:  Poor





Results


Vitals/IOs





Vital Signs








  Date Time  Temp Pulse Resp B/P (MAP) Pulse Ox O2 Delivery O2 Flow Rate FiO2


 


6/1/18 17:26 98.1 84 17 111/61 (78) 95   














Intake and Output   


 


 6/1/18 6/1/18 6/2/18





 08:00 16:00 00:00


 


Intake Total 240 ml 120 ml 240 ml


 


Balance 240 ml 120 ml 240 ml











Assessment & Plan


Problem List:  


(1) Unspecified psychosis


ICD Codes:  F29 - Unspecified psychosis not due to a substance or known 

physiological condition


Assessment & Plan


Patient this time continues to be worried about potential residential referral 

but also that her sister has been helpful and regaining her accounts from her 

granddaughter.  Continue current treatment.  We will consult with oncology 

regards to patient's previous cancer treatments.  Continue to monitor mood and 

behavior.  Discharge planning in progress.


Justification for Cont. Inpt.


At risk for further decompensation at lower level of care.


Discharge Planning


To be determined











Daniel Lundy MD Jun 1, 2018 18:19

## 2018-06-02 VITALS
HEART RATE: 70 BPM | DIASTOLIC BLOOD PRESSURE: 62 MMHG | SYSTOLIC BLOOD PRESSURE: 110 MMHG | OXYGEN SATURATION: 98 % | RESPIRATION RATE: 18 BRPM | TEMPERATURE: 97.6 F

## 2018-06-02 VITALS
TEMPERATURE: 95.9 F | DIASTOLIC BLOOD PRESSURE: 59 MMHG | OXYGEN SATURATION: 99 % | HEART RATE: 62 BPM | SYSTOLIC BLOOD PRESSURE: 108 MMHG | RESPIRATION RATE: 17 BRPM

## 2018-06-02 RX ADMIN — METOPROLOL TARTRATE SCH MG: 25 TABLET, FILM COATED ORAL at 20:46

## 2018-06-02 RX ADMIN — METOPROLOL TARTRATE SCH MG: 25 TABLET, FILM COATED ORAL at 09:00

## 2018-06-02 RX ADMIN — LISINOPRIL SCH MG: 5 TABLET ORAL at 09:00

## 2018-06-02 RX ADMIN — FLUTICASONE PROPIONATE SCH SPRAY: 50 SPRAY, METERED NASAL at 09:36

## 2018-06-02 RX ADMIN — PRAVASTATIN SODIUM SCH MG: 40 TABLET ORAL at 09:35

## 2018-06-02 RX ADMIN — FLUTICASONE PROPIONATE SCH SPRAY: 50 SPRAY, METERED NASAL at 20:47

## 2018-06-02 RX ADMIN — ACETAMINOPHEN PRN MG: 325 TABLET ORAL at 21:29

## 2018-06-02 RX ADMIN — ACETAMINOPHEN PRN MG: 325 TABLET ORAL at 09:45
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
## 2018-06-03 VITALS
TEMPERATURE: 97.4 F | SYSTOLIC BLOOD PRESSURE: 143 MMHG | RESPIRATION RATE: 16 BRPM | DIASTOLIC BLOOD PRESSURE: 72 MMHG | OXYGEN SATURATION: 98 % | HEART RATE: 58 BPM

## 2018-06-03 RX ADMIN — LISINOPRIL SCH MG: 5 TABLET ORAL at 09:02

## 2018-06-03 RX ADMIN — FLUTICASONE PROPIONATE SCH SPRAY: 50 SPRAY, METERED NASAL at 20:23

## 2018-06-03 RX ADMIN — METOPROLOL TARTRATE SCH MG: 25 TABLET, FILM COATED ORAL at 20:23

## 2018-06-03 RX ADMIN — METOPROLOL TARTRATE SCH MG: 25 TABLET, FILM COATED ORAL at 09:00

## 2018-06-03 RX ADMIN — FLUTICASONE PROPIONATE SCH SPRAY: 50 SPRAY, METERED NASAL at 09:02

## 2018-06-03 RX ADMIN — ACETAMINOPHEN PRN MG: 325 TABLET ORAL at 20:24

## 2018-06-03 RX ADMIN — PRAVASTATIN SODIUM SCH MG: 40 TABLET ORAL at 09:02
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
## 2018-06-04 VITALS
RESPIRATION RATE: 16 BRPM | TEMPERATURE: 97.4 F | HEART RATE: 58 BPM | SYSTOLIC BLOOD PRESSURE: 137 MMHG | OXYGEN SATURATION: 97 % | DIASTOLIC BLOOD PRESSURE: 66 MMHG

## 2018-06-04 VITALS
TEMPERATURE: 98.1 F | SYSTOLIC BLOOD PRESSURE: 107 MMHG | RESPIRATION RATE: 16 BRPM | HEART RATE: 84 BPM | OXYGEN SATURATION: 97 % | DIASTOLIC BLOOD PRESSURE: 59 MMHG

## 2018-06-04 RX ADMIN — METOPROLOL TARTRATE SCH MG: 25 TABLET, FILM COATED ORAL at 09:00

## 2018-06-04 RX ADMIN — METOPROLOL TARTRATE SCH MG: 25 TABLET, FILM COATED ORAL at 20:49

## 2018-06-04 RX ADMIN — HYDROCODONE BITARTRATE AND ACETAMINOPHEN PRN TAB: 5; 325 TABLET ORAL at 16:55

## 2018-06-04 RX ADMIN — LISINOPRIL SCH MG: 5 TABLET ORAL at 09:00

## 2018-06-04 RX ADMIN — FLUTICASONE PROPIONATE SCH SPRAY: 50 SPRAY, METERED NASAL at 20:44

## 2018-06-04 RX ADMIN — PRAVASTATIN SODIUM SCH MG: 40 TABLET ORAL at 09:00

## 2018-06-04 RX ADMIN — BACLOFEN SCH MG: 10 TABLET ORAL at 20:44

## 2018-06-04 RX ADMIN — FLUTICASONE PROPIONATE SCH SPRAY: 50 SPRAY, METERED NASAL at 09:00

## 2018-06-04 NOTE — HHI.PYPN
Subjective


Remarks


Patient seen for follow, chart reviewed.  Discussion nursing staff reported the 

patient was seen by oncology consultant recently and pending recommendations, 

denying any suicide ideations at this time.  Patient was found lying hospital 

bed noted B, cooperative.  Patient states that she continues to have worsening 

of pain of her knee and joint stating that she was previously managed with 

opioid analgesics in the past which have been helpful and has only been taking 

Tylenol here.  Patient also mentions having seen an oncologist last evening and 

were requesting medical records from outpatient provider to make 

recommendations here in the hospital.  Patient states that she had attended 

some groups over the weekend.  Patient continues to be worried about where she 

will be living after discharge but that her daughter is currently involved in 

assisting with this.  Patient denies any SI, HI, AVH or delusions.





Review of Systems


Except as stated in HPI:  all other systems reviewed are Neg





Mental Status Examination


Appearance:  Appropriate


Consciousness:  Alert


Orientation:  x4


Motor Activity:  Other (No motor abnormalities noted)


Speech:  Unremarkable


Language:  Adequate


Fund of Knowledge:  Adequate


Attention and Concentration:  Adequate


Memory:  Unremarkable (Grossly intact on clinical exam)


Mood:  Anxious


Affect:  Anxious (Less so today)


Thought Process & Associations:  Intact


Thought Content:  Appropriate


Hallucination Type:  None


Delusion Type:  Paranoid


Suicidal Ideation:  No


Suicidal Plan:  No


Suicidal Intention:  No


Homicidal Ideation:  No


Homicidal Plan:  No


Homicidal Intention:  No


Insight:  Fair


Judgment:  Impulsive





Results


Vitals/IOs





Vital Signs








  Date Time  Temp Pulse Resp B/P (MAP) Pulse Ox O2 Delivery O2 Flow Rate FiO2


 


6/4/18 05:35 97.4 58 16 137/66 (89) 97   














Intake and Output   


 


 6/4/18 6/4/18 6/5/18





 08:00 16:00 00:00


 


Intake Total 0 ml 120 ml 600 ml


 


Balance 0 ml 120 ml 600 ml











Assessment & Plan


Problem List:  


(1) Unspecified psychosis


ICD Codes:  F29 - Unspecified psychosis not due to a substance or known 

physiological condition


Assessment & Plan


Patient this time noted with improvement in mood, less paranoia concerning her 

granddaughter, continues to search for assistance from her daughter to help 

with where she will be living after discharge.  Patient reporting significant 

increase in pain, we will consult hospitalist for recommendations for pain 

management.  We will continue current treatment.  Continue monitor mood and 

behavior.  We will continue to await recommendations from oncology consult 

team.  Discharge planning in progress.


Justification for Cont. Inpt.


At risk for further decompensation if at lower level of care


Discharge Planning


To be determined.











Daniel Lundy MD Jun 4, 2018 17:51
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
## 2018-06-05 VITALS
HEART RATE: 62 BPM | DIASTOLIC BLOOD PRESSURE: 56 MMHG | RESPIRATION RATE: 16 BRPM | TEMPERATURE: 97.3 F | SYSTOLIC BLOOD PRESSURE: 113 MMHG | OXYGEN SATURATION: 92 %

## 2018-06-05 VITALS
DIASTOLIC BLOOD PRESSURE: 56 MMHG | SYSTOLIC BLOOD PRESSURE: 111 MMHG | RESPIRATION RATE: 16 BRPM | OXYGEN SATURATION: 93 % | HEART RATE: 66 BPM | TEMPERATURE: 97.7 F

## 2018-06-05 RX ADMIN — ESCITALOPRAM OXALATE SCH MG: 10 TABLET, FILM COATED ORAL at 12:30

## 2018-06-05 RX ADMIN — LISINOPRIL SCH MG: 5 TABLET ORAL at 08:46

## 2018-06-05 RX ADMIN — HYDROCODONE BITARTRATE AND ACETAMINOPHEN PRN TAB: 5; 325 TABLET ORAL at 20:33

## 2018-06-05 RX ADMIN — FLUTICASONE PROPIONATE SCH SPRAY: 50 SPRAY, METERED NASAL at 08:46

## 2018-06-05 RX ADMIN — ACETAMINOPHEN PRN MG: 325 TABLET ORAL at 05:47

## 2018-06-05 RX ADMIN — FLUTICASONE PROPIONATE SCH SPRAY: 50 SPRAY, METERED NASAL at 20:34

## 2018-06-05 RX ADMIN — BACLOFEN SCH MG: 10 TABLET ORAL at 08:46

## 2018-06-05 RX ADMIN — METOPROLOL TARTRATE SCH MG: 25 TABLET, FILM COATED ORAL at 08:46

## 2018-06-05 RX ADMIN — BACLOFEN SCH MG: 10 TABLET ORAL at 20:32

## 2018-06-05 RX ADMIN — METOPROLOL TARTRATE SCH MG: 25 TABLET, FILM COATED ORAL at 20:35

## 2018-06-05 RX ADMIN — PRAVASTATIN SODIUM SCH MG: 40 TABLET ORAL at 08:46

## 2018-06-05 NOTE — HHI.PR
Subjective


Remarks


Follow-up visit pain management.  Patient seen and examined today.  Reports she 

is doing well.  States she is now able to move around better, ambulating in the 

hallway better.  States that the baclofen and Norco's have been helping her and 

she really had a good night sleep and has more energy because she is able to do 

so.  Denies pain and discomfort.  Denies SOB/ dyspnea.  Denies chest pain, 

palpitations, headaches, dizziness. Denies fevers, chills, n/v/d.  Denies 

dysuria.





Objective


Vitals





Vital Signs








  Date Time  Temp Pulse Resp B/P (MAP) Pulse Ox O2 Delivery O2 Flow Rate FiO2


 


6/5/18 06:10 97.7 66 16 111/56 (74) 93   


 


6/4/18 17:45 98.1 84 16 107/59 (75) 97   














I/O      


 


 6/4/18 6/4/18 6/4/18 6/5/18 6/5/18 6/5/18





 07:00 15:00 23:00 07:00 15:00 23:00


 


Intake Total 0 ml 120 ml 840 ml   


 


Balance 0 ml 120 ml 840 ml   


 


      


 


Intake Oral 0 ml 120 ml 840 ml   


 


# Voids  1    








Objective Remarks


GENERAL: This is a well-nourished, well-developed patient, in no apparent 

distress.


SKIN: Warm and dry


HEENT: Normocephalic. Pupils equal round and reactive. Nose without bleeding. 

Airway patent.


NECK: Trachea midline. 


CARDIOVASCULAR: Regular rate and rhythm without murmurs, gallops, or rubs. 


RESPIRATORY: No wheezes, rales, or rhonchi.  Diminished bases


GASTROINTESTINAL: Abdomen soft, non-tender, nondistended. Bowel Sounds 

normoactive x4.


MUSCULOSKELETAL: Extremities without clubbing, cyanosis, or edema.


NEUROLOGICAL: Awake and alert. Oriented to place, person. No focal neuro 

deficit.  Moves all extremities. Normal speech.


Procedures


None





A/P


Problem List:  


(1) Unspecified psychosis


ICD Code:  F29 - Unspecified psychosis not due to a substance or known 

physiological condition


(2) HTN (hypertension)


ICD Code:  I10 - HTN (hypertension)


Status:  Acute


(3) CKD (chronic kidney disease)


ICD Code:  N18.9 - CKD (chronic kidney disease)


Status:  Acute


Assessment and Plan


Patient is a 68 year old female was admitted to the hospital for unspecified 

psychosis.  She is now admitted medical psychiatric unit for further 

evaluation.  Consulted for assistance with medical management.





Chronic pain, arthritis


   -Restart hydrocodone 5/325 milligrams every 8 hours.  Discussed extensively 

with patient that the medication will only be given while she is in acute care 

setting.  She will not get the prescription from us in when discharged


   -She will need to follow-up with PCP or pain management doctor for pain 

medication


   -Add baclofen 10 mg twice daily


   -Improved





Anxiety


Depression


Psychosis


   -Management per psychiatry








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


   -Creatinine levels are approximating her previous baseline


   -Avoid nephrotoxins.  Encourage p.o. fluid hydration


   -Improved





Hyponatremia


   -No acute treatment needed


   -Resolved





CML


Chronic anemia


   -Follow with oncology as an outpatient


   -No evidence of acute exacerbation





Hyperlipidemia


Hypertension


   -Continue lisinopril 5 mg daily, continue metoprolol 25 mg twice daily, 

increased dose of pravastatin 40 mg daily ASCVD scores recommends moderate to 

high intensity statin


   -Monitor BP trend.





Borderline diabetes


   -Discussed extensively with patient results of hemoglobin A1c


   -Recommended lifestyle changes including diet and exercise


   -Follow-up with outpatient





Nasal congestion, seasonal allergy


   -Flonase nasal spray





History of seizure


Sleep apnea


Osteoarthritis


Coronary artery disease


Old MI


CHF


COPD


   -No exacerbations


   -Asymptomatic


   -No plan changes to baseline treatments





DVT prophylaxis


   -Ambulation with physical therapy





Stable from Hospitalist standpoint.  We will sign off.  Reconsult as needed.





Problem Qualifiers





(1) CKD (chronic kidney disease):  


Qualified Codes:  N18.9 - Chronic kidney disease, unspecified








Antonia Hewitt Jun 5, 2018 14:08

## 2018-06-05 NOTE — HHI.PYPN
Subjective


Remarks


Patient seen for follow, chart reviewed.  Discussion nursing staff reported the 

patient to be somewhat depressed today, reporting less pain today.  Patient was 

found family on unit noted B, cooperative.  Patient states that she did see a 

medical doctor yesterday which adjusted her pain medication regimen which she 

reports is improving her pain.  She states that she is spoke with her daughter 

yesterday which she lost the patient's over the phone" and got her upset 

started feeling more depressed.  She mentions that at the time of her cancer 

diagnoses she recalled daughter having told her doctor not to continue 

treatment to let her die which again upsets her.  She states that she had been 

treated for depression in the past and was on Lexapro which was helpful in the 

past.  Patient states that her mood was more stable when she was on this 

medication.  She continues to feel worried about where she will go upon 

discharge and states she had been crying more recently due to the same.  She 

reports tolerating medications well denying any suicide ideations at this time.

  Denies any other physical complaints.





Review of Systems


Except as stated in HPI:  all other systems reviewed are Neg





Mental Status Examination


Appearance:  Appropriate


Consciousness:  Alert


Orientation:  x4


Motor Activity:  Other (No motor abnormalities noted)


Speech:  Unremarkable


Language:  Adequate


Fund of Knowledge:  Adequate


Attention and Concentration:  Adequate


Memory:  Unremarkable (Grossly intact on clinical exam)


Mood:  Sad


Affect:  Sad


Thought Process & Associations:  Intact


Thought Content:  Appropriate


Hallucination Type:  None


Delusion Type:  Paranoid


Suicidal Ideation:  No


Suicidal Plan:  No


Suicidal Intention:  No


Homicidal Ideation:  No


Homicidal Plan:  No


Homicidal Intention:  No


Insight:  Fair


Judgment:  Impulsive





Results


Vitals/IOs





Vital Signs








  Date Time  Temp Pulse Resp B/P (MAP) Pulse Ox O2 Delivery O2 Flow Rate FiO2


 


6/5/18 06:10 97.7 66 16 111/56 (98) 93   











Assessment & Plan


Problem List:  


(1) Unspecified psychosis


ICD Codes:  F29 - Unspecified psychosis not due to a substance or known 

physiological condition


Assessment & Plan


Patient this time reporting depressed mood in the context of her current 

psychosocial stressors and worry of be continued on her outpatient treatment 

for her cancer.  We will start citalopram 10 mg p.o. daily for depression, we 

will continue rest of medications.  We will continue to monitor mood and 

behavior.  Recommendations from oncology team consulted pending.  Discharge 

planning in progress.


Justification for Cont. Inpt.


At risk of further decompensation a lower level of care.


Discharge Planning


To be determined.











Daniel Lundy MD Jun 5, 2018 16:20

## 2018-06-06 VITALS
OXYGEN SATURATION: 92 % | RESPIRATION RATE: 16 BRPM | HEART RATE: 84 BPM | SYSTOLIC BLOOD PRESSURE: 108 MMHG | DIASTOLIC BLOOD PRESSURE: 53 MMHG

## 2018-06-06 VITALS — RESPIRATION RATE: 16 BRPM | SYSTOLIC BLOOD PRESSURE: 122 MMHG | DIASTOLIC BLOOD PRESSURE: 88 MMHG

## 2018-06-06 RX ADMIN — ACETAMINOPHEN PRN MG: 325 TABLET ORAL at 05:24

## 2018-06-06 RX ADMIN — METOPROLOL TARTRATE SCH MG: 25 TABLET, FILM COATED ORAL at 09:26

## 2018-06-06 RX ADMIN — ACETAMINOPHEN PRN MG: 325 TABLET ORAL at 14:32

## 2018-06-06 RX ADMIN — FLUTICASONE PROPIONATE SCH SPRAY: 50 SPRAY, METERED NASAL at 09:27

## 2018-06-06 RX ADMIN — PRAVASTATIN SODIUM SCH MG: 40 TABLET ORAL at 09:27

## 2018-06-06 RX ADMIN — HYDROCODONE BITARTRATE AND ACETAMINOPHEN PRN TAB: 5; 325 TABLET ORAL at 22:53

## 2018-06-06 RX ADMIN — BACLOFEN SCH MG: 10 TABLET ORAL at 21:27

## 2018-06-06 RX ADMIN — ESCITALOPRAM OXALATE SCH MG: 10 TABLET, FILM COATED ORAL at 09:27

## 2018-06-06 RX ADMIN — METOPROLOL TARTRATE SCH MG: 25 TABLET, FILM COATED ORAL at 21:37

## 2018-06-06 RX ADMIN — LISINOPRIL SCH MG: 5 TABLET ORAL at 09:26

## 2018-06-06 RX ADMIN — FLUTICASONE PROPIONATE SCH SPRAY: 50 SPRAY, METERED NASAL at 21:24

## 2018-06-06 RX ADMIN — BACLOFEN SCH MG: 10 TABLET ORAL at 09:27

## 2018-06-06 NOTE — PD.TTN
Patient Problems


1. Discharge planning


2. Medication compliance


3. Knowledge deficit


4. Lack of coping skills





Progress Toward Goals


Provider Present:  Dr. GREY Lundy


Provider Input:  


6/6/18Pending [place] to [place]


Nurse(s) Present:  Mj


Nurse(s) Input:  


6/6/18Pt is cooperative, pleasant, social, has concerns about Cancer


Psychiatric Counselors Present:  Meme Brenner, [facility], Eugene Olivares Jr., 

[facility], Mandy Arora, [facility]


Group Spec/RT/OT/ALVAREZ Present:  KIM Mcnamara, Cesar Rivas, OT


Group Spec/RT/OT/ALVAREZ Input:  


6/6/18Pt attends select groups on unit. Remains isolated to room.











Isabel Knight/L Jun 6, 2018 10:46

## 2018-06-06 NOTE — EKG
Date Performed: 06/05/2018       Time Performed: 22:25:22

 

PTAGE:      68 years

 

EKG:      Sinus rhythm 

 

 POSSIBLE LEFT ATRIAL ENLARGEMENT LEFT AXIS DEVIATION BORDERLINE ECG

 

PREVIOUS TRACING       : 02/06/2015 10.10 Compared to previous tracing,  RBBB no longer present

 

DOCTOR:   Sandi Ho  Interpretating Date/Time  06/06/2018 19:03:53

## 2018-06-06 NOTE — PD.ONC.PN
Subjective


Subjective


Remarks


Afebrile


Patient seen and examined in the community room


No acute complaints


Looking forward to discharge at some point





Objective


Data











  Date Time  Temp Pulse Resp B/P (MAP) Pulse Ox O2 Delivery O2 Flow Rate FiO2


 


6/5/18 23:12   18     


 


6/5/18 18:23 97.3 62 16 113/56 (75) 92   














 6/6/18 6/6/18 6/6/18





 07:00 15:00 23:00


 


Intake Total  720 ml 


 


Balance  720 ml 











Administered Medications








 Medications


  (Trade)  Dose


 Ordered  Sig/Randall


 Route


 PRN Reason  Start Time


 Stop Time Status Last Admin


Dose Admin


 


 Lisinopril


  (Prinivil)  5 mg  DAILY


 PO


   5/24/18 10:00


    6/6/18 09:26


 


 


 Metoprolol


 Tartrate


  (Lopressor)  25 mg  BID


 PO


   5/24/18 21:00


    6/6/18 09:26


 


 


 Lorazepam


  (Ativan)  0.5 mg  Q12H  PRN


 PO


 MODERATE TO SEVERE ANXIETY  5/24/18 10:15


    6/2/18 23:37


 


 


 Acetaminophen


  (Tylenol)  650 mg  Q4H  PRN


 PO


 Pain 1-4 or Temp >101F, HA  5/24/18 10:15


    6/6/18 14:32


 


 


 Lorazepam


  (Ativan)  1 mg  Q4H  PRN


 PO


 CIWA 8 - 10  5/24/18 10:15


    6/1/18 17:22


 


 


 Quetiapine


 Fumarate


  (SEROquel)  25 mg  BID@09,12


 PO


   5/24/18 12:00


    6/6/18 13:01


 


 


 Clonazepam


  (KlonoPIN)  0.5 mg  Q8HR


 PO


   5/24/18 14:00


    6/6/18 14:33


 


 


 Pravastatin Sodium


  (Pravachol)  40 mg  DAILY


 PO


   5/27/18 09:00


    6/6/18 09:27


 


 


 Fluticasone


 Propionate


  (Flonase Eyal Spr)  1 spray  BID


 NASAL


   5/27/18 14:30


    6/6/18 09:27


 


 


 Acetaminophen/


 Hydrocodone Bitart


  (Norco  5-325 Mg)  1 tab  Q8HR  PRN


 PO


 Pain 5-10  6/4/18 15:15


    6/5/18 20:33


 


 


 Baclofen


  (Lioresal)  10 mg  BID


 PO


   6/4/18 21:00


    6/6/18 09:27


 


 


 Escitalopram


 Oxalate


  (Lexapro)  10 mg  DAILY


 PO


   6/5/18 12:30


    6/6/18 09:27


 








Objective Remarks


GENERAL: Older female walking around community room with the assistance of 

walker


SKIN: Warm and dry.


HEAD: Normocephalic.


EYES: No scleral icterus. No injection or drainage. 


NECK: Supple, trachea midline. No JVD or lymphadenopathy.


CARDIOVASCULAR: Regular rate and rhythm without murmurs. 


RESPIRATORY: Breath sounds equal bilaterally. No accessory muscle use.


GASTROINTESTINAL: Abdomen soft, non-tender, nondistended. 


EXTREMITIES: No cyanosis, or edema. 


MUSCULOSKELETAL: Generalized weakness


NEUROLOGICAL: No obvious focal deficit. Awake, alert, and oriented x3.





Assessment/Plan


Problem List:  


(1) CML (chronic myeloid leukemia)


ICD Codes:  C92.10 - Chronic myeloid leukemia, BCR/ABL-positive, not having 

achieved remission


Plan:  Patient was reportedly on Tasignia prior to admission to psychiatric 

unit. 


She reportedly follows up with Dr. Helton at [facility].  


She reports that she was out of her medication for approximately 3 weeks prior 

to admission 





Assessment


68-year-old female seen in the psychiatric unit; oncology consulted for history 

of questionable CML.


Plan


1.  Records are still unavailable from [facility].  I have 

requested to get records of the flow cytometry as well as blood work around the 

time of diagnosis and treatment.


2.  We will check CBC in Randi James Jun 6, 2018 14:45

## 2018-06-06 NOTE — HHI.PYPN
Subjective


Remarks


Patient seen for follow up, chart reviewed.  Discussion with nursing staff 

reported the patient had been reporting decreased pain, continue worried about 

where he will be discharged.  Patient was found lying hospital bed asleep was 

able to wake up to interact with interview today.  Patient states that her pain 

has been better, denying any adverse drug reactions to treatment regimen.  

Patient states her mood has been better at that she had reconciled with her 

daughter from her last discord over the phone yesterday.  She states also 

sleeping well, eating and drinking well and plans on attending more groups.  

Patient is aware of the possible referral to an assisted living facility and is 

waiting finalization of this process.  Patient denying any suicidal ideation 

and tolerating antidepressant well.





Review of Systems


Except as stated in HPI:  all other systems reviewed are Neg





Mental Status Examination


Appearance:  Appropriate


Consciousness:  Alert


Orientation:  x4


Motor Activity:  Other (No motor abnormalities noted)


Speech:  Unremarkable


Language:  Adequate


Fund of Knowledge:  Adequate


Attention and Concentration:  Adequate


Memory:  Unremarkable (Grossly intact on clinical exam)


Mood:  Sad (Less so today)


Affect:  Sad (Less so today)


Thought Process & Associations:  Intact


Thought Content:  Appropriate


Hallucination Type:  None


Delusion Type:  Paranoid (Lessening)


Suicidal Ideation:  No


Suicidal Plan:  No


Suicidal Intention:  No


Homicidal Ideation:  No


Homicidal Plan:  No


Homicidal Intention:  No


Insight:  Fair


Judgment:  Impulsive





Results


Vitals/IOs





Vital Signs








  Date Time  Temp Pulse Resp B/P (MAP) Pulse Ox O2 Delivery O2 Flow Rate FiO2


 


6/5/18 23:12   18     


 


6/5/18 18:23 97.3 62  113/56 (75) 92   














Intake and Output   


 


 6/6/18 6/6/18 6/6/18





 07:59 15:59 23:59


 


Intake Total 240 ml 480 ml 


 


Balance 240 ml 480 ml 











Assessment & Plan


Problem List:  


(1) Unspecified psychosis


ICD Codes:  F29 - Unspecified psychosis not due to a substance or known 

physiological condition


Assessment & Plan


Patient this time noted with improvement of mood, tolerating addition of 

antidepressant well.  Patient denied any suicide ideations, noted to have less 

intense paranoia with her granddaughter.  We will continue current treatment.  

We will continue to wait for recommendations per oncology consultation as they 

continue follow-up with her.  Consult input appreciated..  We will continue to 

monitor mood and behavior.  Discharge planning in progress.


Justification for Cont. Inpt.


At risk of further decompensation a lower level care.


Discharge Planning


Possible referral to an assisted living facility.











Daniel Lundy MD Jun 6, 2018 16:58

## 2018-06-07 VITALS — RESPIRATION RATE: 16 BRPM | HEART RATE: 71 BPM | DIASTOLIC BLOOD PRESSURE: 73 MMHG | SYSTOLIC BLOOD PRESSURE: 118 MMHG

## 2018-06-07 VITALS
OXYGEN SATURATION: 94 % | HEART RATE: 72 BPM | TEMPERATURE: 97.5 F | RESPIRATION RATE: 17 BRPM | SYSTOLIC BLOOD PRESSURE: 129 MMHG | DIASTOLIC BLOOD PRESSURE: 62 MMHG

## 2018-06-07 LAB
BASOPHILS # BLD AUTO: 0.2 TH/MM3 (ref 0–0.2)
BASOPHILS NFR BLD: 2.6 % (ref 0–2)
EOSINOPHIL # BLD: 0.3 TH/MM3 (ref 0–0.4)
EOSINOPHIL NFR BLD: 4.3 % (ref 0–4)
ERYTHROCYTE [DISTWIDTH] IN BLOOD BY AUTOMATED COUNT: 14.1 % (ref 11.6–17.2)
HCT VFR BLD CALC: 39.4 % (ref 35–46)
HGB BLD-MCNC: 12.9 GM/DL (ref 11.6–15.3)
LYMPHOCYTES # BLD AUTO: 2.2 TH/MM3 (ref 1–4.8)
LYMPHOCYTES NFR BLD AUTO: 31.5 % (ref 9–44)
MCH RBC QN AUTO: 28.8 PG (ref 27–34)
MCHC RBC AUTO-ENTMCNC: 32.6 % (ref 32–36)
MCV RBC AUTO: 88.3 FL (ref 80–100)
MONOCYTE #: 0.7 TH/MM3 (ref 0–0.9)
MONOCYTES NFR BLD: 9.8 % (ref 0–8)
NEUTROPHILS # BLD AUTO: 3.6 TH/MM3 (ref 1.8–7.7)
NEUTROPHILS NFR BLD AUTO: 51.8 % (ref 16–70)
PLATELET # BLD: 306 TH/MM3 (ref 150–450)
PMV BLD AUTO: 9.9 FL (ref 7–11)
RBC # BLD AUTO: 4.46 MIL/MM3 (ref 4–5.3)
WBC # BLD AUTO: 6.9 TH/MM3 (ref 4–11)

## 2018-06-07 RX ADMIN — BACLOFEN SCH MG: 10 TABLET ORAL at 08:38

## 2018-06-07 RX ADMIN — HYDROCODONE BITARTRATE AND ACETAMINOPHEN PRN TAB: 5; 325 TABLET ORAL at 23:41

## 2018-06-07 RX ADMIN — ACETAMINOPHEN PRN MG: 325 TABLET ORAL at 21:10

## 2018-06-07 RX ADMIN — HYDROCODONE BITARTRATE AND ACETAMINOPHEN PRN TAB: 5; 325 TABLET ORAL at 06:17

## 2018-06-07 RX ADMIN — FLUTICASONE PROPIONATE SCH SPRAY: 50 SPRAY, METERED NASAL at 21:09

## 2018-06-07 RX ADMIN — FLUTICASONE PROPIONATE SCH SPRAY: 50 SPRAY, METERED NASAL at 08:38

## 2018-06-07 RX ADMIN — HYDROCODONE BITARTRATE AND ACETAMINOPHEN PRN TAB: 5; 325 TABLET ORAL at 15:55

## 2018-06-07 RX ADMIN — BACLOFEN SCH MG: 10 TABLET ORAL at 21:09

## 2018-06-07 RX ADMIN — PRAVASTATIN SODIUM SCH MG: 40 TABLET ORAL at 08:38

## 2018-06-07 RX ADMIN — METOPROLOL TARTRATE SCH MG: 25 TABLET, FILM COATED ORAL at 08:38

## 2018-06-07 RX ADMIN — ESCITALOPRAM OXALATE SCH MG: 10 TABLET, FILM COATED ORAL at 08:38

## 2018-06-07 RX ADMIN — METOPROLOL TARTRATE SCH MG: 25 TABLET, FILM COATED ORAL at 21:09

## 2018-06-07 RX ADMIN — LISINOPRIL SCH MG: 5 TABLET ORAL at 08:37

## 2018-06-07 NOTE — PD.ONC.PN
Subjective


Subjective


Remarks


Afebrile overnight. 


Patient seen in common room eating lunch. 


She states she feels fine and has no complaints.





Objective


Data











  Date Time  Temp Pulse Resp B/P (MAP) Pulse Ox O2 Delivery O2 Flow Rate FiO2


 


6/7/18 07:10   16     


 


6/7/18 06:08 97.5 72 17 129/62 (84) 94   


 


6/6/18 21:45   16 122/88 (99)    


 


6/6/18 17:54  84 16 108/53 (71) 92   














 6/7/18 6/7/18 6/7/18





 07:00 15:00 23:00


 


Intake Total  720 ml 


 


Balance  720 ml 








Result Diagram:  


6/7/18 0711





Laboratory Results





Laboratory Tests








Test


  6/7/18


07:11


 


White Blood Count 6.9 TH/MM3 


 


Red Blood Count 4.46 MIL/MM3 


 


Hemoglobin 12.9 GM/DL 


 


Hematocrit 39.4 % 


 


Mean Corpuscular Volume 88.3 FL 


 


Mean Corpuscular Hemoglobin 28.8 PG 


 


Mean Corpuscular Hemoglobin


Concent 32.6 % 


 


 


Red Cell Distribution Width 14.1 % 


 


Platelet Count 306 TH/MM3 


 


Mean Platelet Volume 9.9 FL 


 


Neutrophils (%) (Auto) 51.8 % 


 


Lymphocytes (%) (Auto) 31.5 % 


 


Monocytes (%) (Auto) 9.8 % 


 


Eosinophils (%) (Auto) 4.3 % 


 


Basophils (%) (Auto) 2.6 % 


 


Neutrophils # (Auto) 3.6 TH/MM3 


 


Lymphocytes # (Auto) 2.2 TH/MM3 


 


Monocytes # (Auto) 0.7 TH/MM3 


 


Eosinophils # (Auto) 0.3 TH/MM3 


 


Basophils # (Auto) 0.2 TH/MM3 


 


CBC Comment DIFF FINAL 


 


Differential Comment  











Administered Medications








 Medications


  (Trade)  Dose


 Ordered  Sig/Randall


 Route


 PRN Reason  Start Time


 Stop Time Status Last Admin


Dose Admin


 


 Lisinopril


  (Prinivil)  5 mg  DAILY


 PO


   5/24/18 10:00


    6/7/18 08:37


 


 


 Metoprolol


 Tartrate


  (Lopressor)  25 mg  BID


 PO


   5/24/18 21:00


    6/7/18 08:38


 


 


 Lorazepam


  (Ativan)  0.5 mg  Q12H  PRN


 PO


 MODERATE TO SEVERE ANXIETY  5/24/18 10:15


    6/2/18 23:37


 


 


 Acetaminophen


  (Tylenol)  650 mg  Q4H  PRN


 PO


 Pain 1-4 or Temp >101F, HA  5/24/18 10:15


    6/6/18 14:32


 


 


 Lorazepam


  (Ativan)  1 mg  Q4H  PRN


 PO


 CIWA 8 - 10  5/24/18 10:15


    6/1/18 17:22


 


 


 Quetiapine


 Fumarate


  (SEROquel)  25 mg  BID@09,12


 PO


   5/24/18 12:00


    6/7/18 11:24


 


 


 Clonazepam


  (KlonoPIN)  0.5 mg  Q8HR


 PO


   5/24/18 14:00


    6/7/18 05:26


 


 


 Pravastatin Sodium


  (Pravachol)  40 mg  DAILY


 PO


   5/27/18 09:00


    6/7/18 08:38


 


 


 Fluticasone


 Propionate


  (Flonase Eyal Spr)  1 spray  BID


 NASAL


   5/27/18 14:30


    6/7/18 08:38


 


 


 Acetaminophen/


 Hydrocodone Bitart


  (Norco  5-325 Mg)  1 tab  Q8HR  PRN


 PO


 Pain 5-10  6/4/18 15:15


    6/7/18 06:17


 


 


 Baclofen


  (Lioresal)  10 mg  BID


 PO


   6/4/18 21:00


    6/7/18 08:38


 


 


 Escitalopram


 Oxalate


  (Lexapro)  10 mg  DAILY


 PO


   6/5/18 12:30


    6/7/18 08:38


 








Objective Remarks


GENERAL: Pleasant middle aged female, sitting up in bed in nad. 


SKIN: Warm and dry.


HEAD: Normocephalic.


EYES: No injection or drainage. 


NECK: Supple, trachea midline.


CARDIOVASCULAR: Regular rate and rhythm


RESPIRATORY: Breath sounds equal bilaterally. No accessory muscle use.


GASTROINTESTINAL: Abdomen soft, non-tender, nondistended. 


EXTREMITIES: No cyanosis


NEUROLOGICAL: No obvious focal deficit. Awake, alert, and oriented x3.





Assessment/Plan


Problem List:  


(1) CML (chronic myeloid leukemia)


ICD Codes:  C92.10 - Chronic myeloid leukemia, BCR/ABL-positive, not having 

achieved remission


Plan:  ? on Abimael previously


--follows up with Dr. Helton at [facility] since diagnosis of CML 

in 10/2017


--reports that she was out of her medication for approximately 3 weeks prior to 

admission 





Assessment


68-year-old female seen in the psychiatric unit; oncology consulted for history 

of questionable CML.


Plan


1. records request faxed to [facility] today requesting treatment plan, pathology and 

consultant reports from 2017 onward. 


2. monitor CBC


3. continue supportive care











Hawa Ngo Jun 7, 2018 12:15

## 2018-06-07 NOTE — HHI.PYPN
Subjective


Remarks


Patient seen for follow up; chart reviewed.  Discussion with nursing staff 

reported patient endorsed feeling sad that her roommate was being discharged. 

Patient states that she spoke with a representative from [facility] and that 

they will be coming to see her which she was excited about.  She reports 

feeling worried about her chemotherapy which she would like to resume soon.  

She states feeling more positive and hopes to see her daughter this evening for 

visitation.  She reports sleeping well, pain has been better and denies any SI.





Review of Systems


Except as stated in HPI:  all other systems reviewed are Neg





Mental Status Examination


Appearance:  Appropriate


Consciousness:  Alert


Orientation:  x4


Motor Activity:  Other (No motor abnormalities noted)


Speech:  Unremarkable


Language:  Adequate


Fund of Knowledge:  Adequate


Attention and Concentration:  Adequate


Memory:  Unremarkable (Grossly intact on clinical exam)


Mood:  Appropriate


Affect:  Sad


Thought Process & Associations:  Intact


Thought Content:  Appropriate


Hallucination Type:  None


Delusion Type:  Paranoid


Suicidal Ideation:  No


Suicidal Plan:  No


Suicidal Intention:  No


Homicidal Ideation:  No


Homicidal Plan:  No


Homicidal Intention:  No


Insight:  Fair


Judgment:  Impulsive





Results


Labs


labs reviewed








Test


  6/7/18


07:11


 


White Blood Count 6.9 TH/MM3 


 


Red Blood Count 4.46 MIL/MM3 


 


Hemoglobin 12.9 GM/DL 


 


Hematocrit 39.4 % 


 


Mean Corpuscular Volume 88.3 FL 


 


Mean Corpuscular Hemoglobin 28.8 PG 


 


Mean Corpuscular Hemoglobin


Concent 32.6 % 


 


 


Red Cell Distribution Width 14.1 % 


 


Platelet Count 306 TH/MM3 


 


Mean Platelet Volume 9.9 FL 


 


Neutrophils (%) (Auto) 51.8 % 


 


Lymphocytes (%) (Auto) 31.5 % 


 


Monocytes (%) (Auto) 9.8 % 


 


Eosinophils (%) (Auto) 4.3 % 


 


Basophils (%) (Auto) 2.6 % 


 


Neutrophils # (Auto) 3.6 TH/MM3 


 


Lymphocytes # (Auto) 2.2 TH/MM3 


 


Monocytes # (Auto) 0.7 TH/MM3 


 


Eosinophils # (Auto) 0.3 TH/MM3 


 


Basophils # (Auto) 0.2 TH/MM3 


 


CBC Comment DIFF FINAL 


 


Differential Comment  








Vitals/IOs





Vital Signs








  Date Time  Temp Pulse Resp B/P (MAP) Pulse Ox O2 Delivery O2 Flow Rate FiO2


 


6/7/18 07:10   16     


 


6/7/18 06:08 97.5 72  129/62 (84) 94   














Intake and Output   


 


 6/7/18 6/7/18 6/8/18





 08:00 16:00 00:00


 


Intake Total 720 ml  


 


Balance 720 ml  











Assessment & Plan


Problem List:  


(1) Unspecified psychosis


ICD Codes:  F29 - Unspecified psychosis not due to a substance or known 

physiological condition


Assessment & Plan


Patient currently with improved mood, more hopeful, denies SI.  Continue 

current treatment, will continue recommendations as per oncology consult team 

and primary medical team.  Continue to monitor mood and behavior, discharge 

planning in progress.


Justification for Cont. Inpt.


At risk for further decompensation if at lower level of care.


Discharge Planning


Possible discharge to [facility] MD Jun 7, 2018 14:29

## 2018-06-08 VITALS
RESPIRATION RATE: 18 BRPM | SYSTOLIC BLOOD PRESSURE: 156 MMHG | DIASTOLIC BLOOD PRESSURE: 67 MMHG | HEART RATE: 64 BPM | TEMPERATURE: 97.6 F | OXYGEN SATURATION: 96 %

## 2018-06-08 VITALS
HEART RATE: 66 BPM | OXYGEN SATURATION: 95 % | TEMPERATURE: 98.4 F | SYSTOLIC BLOOD PRESSURE: 147 MMHG | DIASTOLIC BLOOD PRESSURE: 77 MMHG | RESPIRATION RATE: 18 BRPM

## 2018-06-08 RX ADMIN — BACLOFEN SCH MG: 10 TABLET ORAL at 09:19

## 2018-06-08 RX ADMIN — ESCITALOPRAM OXALATE SCH MG: 10 TABLET, FILM COATED ORAL at 09:19

## 2018-06-08 RX ADMIN — METOPROLOL TARTRATE SCH MG: 25 TABLET, FILM COATED ORAL at 09:19

## 2018-06-08 RX ADMIN — FLUTICASONE PROPIONATE SCH SPRAY: 50 SPRAY, METERED NASAL at 21:30

## 2018-06-08 RX ADMIN — HYDROCODONE BITARTRATE AND ACETAMINOPHEN PRN TAB: 5; 325 TABLET ORAL at 13:19

## 2018-06-08 RX ADMIN — METOPROLOL TARTRATE SCH MG: 25 TABLET, FILM COATED ORAL at 21:27

## 2018-06-08 RX ADMIN — BACLOFEN SCH MG: 10 TABLET ORAL at 21:27

## 2018-06-08 RX ADMIN — HYDROCODONE BITARTRATE AND ACETAMINOPHEN PRN TAB: 5; 325 TABLET ORAL at 23:55

## 2018-06-08 RX ADMIN — FLUTICASONE PROPIONATE SCH SPRAY: 50 SPRAY, METERED NASAL at 09:18

## 2018-06-08 RX ADMIN — PRAVASTATIN SODIUM SCH MG: 40 TABLET ORAL at 09:18

## 2018-06-08 RX ADMIN — LISINOPRIL SCH MG: 5 TABLET ORAL at 09:19

## 2018-06-08 NOTE — HHI.PYPN
Subjective


Remarks


Patient seen for follow, chart reviewed.  Discussion nursing staff reported the 

patient reporting feeling "pretty good", expect to see her daughter later this 

evening, reports sleeping well, eating and drinking well.  Patient looking 

forward to possibly meeting with representative from [facility].  Patient 

denies any SI or HI, no AVH or delusions at this time.





Review of Systems


Except as stated in HPI:  all other systems reviewed are Neg





Mental Status Examination


Appearance:  Appropriate


Consciousness:  Alert


Orientation:  x4


Motor Activity:  Other (No motor abnormalities noted)


Speech:  Unremarkable


Language:  Adequate


Fund of Knowledge:  Adequate


Attention and Concentration:  Adequate


Memory:  Unremarkable (Grossly intact on clinical exam)


Mood:  Appropriate


Affect:  Appropriate


Thought Process & Associations:  Intact


Thought Content:  Appropriate


Hallucination Type:  None


Delusion Type:  Paranoid


Suicidal Ideation:  No


Suicidal Plan:  No


Suicidal Intention:  No


Homicidal Ideation:  No


Homicidal Plan:  No


Homicidal Intention:  No


Insight:  Fair


Judgment:  Impulsive





Results


Vitals/IOs





Vital Signs








  Date Time  Temp Pulse Resp B/P (MAP) Pulse Ox O2 Delivery O2 Flow Rate FiO2


 


6/8/18 06:05 97.6 64 18 156/67 (96) 96   














Intake and Output   


 


 6/8/18 6/8/18 6/9/18





 08:00 16:00 00:00


 


Intake Total  1080 ml 


 


Balance  1080 ml 











Assessment & Plan


Problem List:  


(1) Unspecified psychosis


ICD Codes:  F29 - Unspecified psychosis not due to a substance or known 

physiological condition


Assessment & Plan


Patient this time continues with preoperative mood, continue to be hopeful that 

she will be able to transition to an assisted living facility.  We will 

continue current treatment and continue monitor mood and behavior.  Continue 

recommendations as per primary medical team.  Discharge planning in progress.


Justification for Cont. Inpt.


At risk for further decompensation if at lower level of care


Discharge Planning


To be determined











Daniel Lundy MD Jun 8, 2018 16:12

## 2018-06-09 VITALS
TEMPERATURE: 97.5 F | OXYGEN SATURATION: 100 % | HEART RATE: 57 BPM | RESPIRATION RATE: 18 BRPM | SYSTOLIC BLOOD PRESSURE: 127 MMHG | DIASTOLIC BLOOD PRESSURE: 62 MMHG

## 2018-06-09 VITALS
TEMPERATURE: 98.7 F | DIASTOLIC BLOOD PRESSURE: 61 MMHG | OXYGEN SATURATION: 96 % | HEART RATE: 64 BPM | SYSTOLIC BLOOD PRESSURE: 110 MMHG | RESPIRATION RATE: 16 BRPM

## 2018-06-09 RX ADMIN — FLUTICASONE PROPIONATE SCH SPRAY: 50 SPRAY, METERED NASAL at 09:00

## 2018-06-09 RX ADMIN — BACLOFEN SCH MG: 10 TABLET ORAL at 09:09

## 2018-06-09 RX ADMIN — HYDROCODONE BITARTRATE AND ACETAMINOPHEN PRN TAB: 5; 325 TABLET ORAL at 20:36

## 2018-06-09 RX ADMIN — BACLOFEN SCH MG: 10 TABLET ORAL at 20:36

## 2018-06-09 RX ADMIN — LISINOPRIL SCH MG: 5 TABLET ORAL at 09:00

## 2018-06-09 RX ADMIN — METOPROLOL TARTRATE SCH MG: 25 TABLET, FILM COATED ORAL at 20:36

## 2018-06-09 RX ADMIN — ESCITALOPRAM OXALATE SCH MG: 10 TABLET, FILM COATED ORAL at 09:10

## 2018-06-09 RX ADMIN — PRAVASTATIN SODIUM SCH MG: 40 TABLET ORAL at 09:10

## 2018-06-09 RX ADMIN — METOPROLOL TARTRATE SCH MG: 25 TABLET, FILM COATED ORAL at 09:00

## 2018-06-09 RX ADMIN — HYDROCODONE BITARTRATE AND ACETAMINOPHEN PRN TAB: 5; 325 TABLET ORAL at 09:09

## 2018-06-09 RX ADMIN — FLUTICASONE PROPIONATE SCH SPRAY: 50 SPRAY, METERED NASAL at 20:36

## 2018-06-09 NOTE — HHI.PYPN
Subjective


Remarks


Pt seen and discussed with staff. She has been cooperative with medications and 

care. She has been pleasant on unit. No behavioral issues today. She states 

that mood is improving and she denies SI/HI today.





Mental Status Examination


Appearance:  Appropriate


Consciousness:  Alert


Orientation:  x4


Motor Activity:  Other (No motor abnormalities noted)


Speech:  Unremarkable


Language:  Adequate


Fund of Knowledge:  Adequate


Attention and Concentration:  Adequate


Memory:  Unremarkable (Grossly intact on clinical exam)


Mood:  Appropriate


Affect:  Appropriate


Thought Process & Associations:  Intact


Thought Content:  Appropriate


Hallucination Type:  None


Delusion Type:  None, Paranoid


Suicidal Ideation:  No


Suicidal Plan:  No


Suicidal Intention:  No


Homicidal Ideation:  No


Homicidal Plan:  No


Homicidal Intention:  No


Insight:  Fair


Judgment:  Impulsive





Results


Vitals/IOs





Vital Signs








  Date Time  Temp Pulse Resp B/P (MAP) Pulse Ox O2 Delivery O2 Flow Rate FiO2


 


6/9/18 06:00 97.5 57 18 127/62 (83) 100   














Intake and Output   


 


 6/9/18 6/9/18 6/10/18





 08:00 16:00 00:00


 


Intake Total  860 ml 


 


Balance  860 ml 











Assessment & Plan


Problem List:  


(1) Unspecified psychosis


ICD Codes:  F29 - Unspecified psychosis not due to a substance or known 

physiological condition


Assessment & Plan


Continue current tx plan.  Estimated LOS:  days


Justification for Cont. Inpt.


risk of decompensation











Meseret Monique MD Jun 9, 2018 15:24

## 2018-06-10 VITALS
SYSTOLIC BLOOD PRESSURE: 155 MMHG | OXYGEN SATURATION: 93 % | TEMPERATURE: 98.2 F | HEART RATE: 71 BPM | RESPIRATION RATE: 16 BRPM | DIASTOLIC BLOOD PRESSURE: 72 MMHG

## 2018-06-10 VITALS
HEART RATE: 76 BPM | RESPIRATION RATE: 18 BRPM | OXYGEN SATURATION: 92 % | TEMPERATURE: 98.3 F | DIASTOLIC BLOOD PRESSURE: 59 MMHG | SYSTOLIC BLOOD PRESSURE: 122 MMHG

## 2018-06-10 RX ADMIN — FLUTICASONE PROPIONATE SCH SPRAY: 50 SPRAY, METERED NASAL at 20:32

## 2018-06-10 RX ADMIN — METOPROLOL TARTRATE SCH MG: 25 TABLET, FILM COATED ORAL at 09:06

## 2018-06-10 RX ADMIN — BACLOFEN SCH MG: 10 TABLET ORAL at 09:05

## 2018-06-10 RX ADMIN — ACETAMINOPHEN PRN MG: 325 TABLET ORAL at 16:12

## 2018-06-10 RX ADMIN — METOPROLOL TARTRATE SCH MG: 25 TABLET, FILM COATED ORAL at 20:34

## 2018-06-10 RX ADMIN — ESCITALOPRAM OXALATE SCH MG: 10 TABLET, FILM COATED ORAL at 09:05

## 2018-06-10 RX ADMIN — LISINOPRIL SCH MG: 5 TABLET ORAL at 09:06

## 2018-06-10 RX ADMIN — PRAVASTATIN SODIUM SCH MG: 40 TABLET ORAL at 09:05

## 2018-06-10 RX ADMIN — HYDROCODONE BITARTRATE AND ACETAMINOPHEN PRN TAB: 5; 325 TABLET ORAL at 10:38

## 2018-06-10 RX ADMIN — BACLOFEN SCH MG: 10 TABLET ORAL at 20:33

## 2018-06-10 RX ADMIN — FLUTICASONE PROPIONATE SCH SPRAY: 50 SPRAY, METERED NASAL at 09:06

## 2018-06-10 NOTE — HHI.PYPN
Subjective


Remarks


Pt seen and discussed with staff. Pt has been cooperative with care and 

compliant with medications. She denies medication side effects. She reports 

that mood is better and she is no longer feeling depressed. Paranoia has 

decreased. No SI/HI





Mental Status Examination


Appearance:  Appropriate


Consciousness:  Alert


Orientation:  x4


Motor Activity:  Other (No motor abnormalities noted)


Speech:  Unremarkable


Language:  Adequate


Fund of Knowledge:  Adequate


Attention and Concentration:  Adequate


Memory:  Unremarkable (Grossly intact on clinical exam)


Mood:  Appropriate


Affect:  Appropriate


Thought Process & Associations:  Intact


Thought Content:  Appropriate


Hallucination Type:  None


Delusion Type:  Paranoid (mild)


Suicidal Ideation:  No


Suicidal Plan:  No


Suicidal Intention:  No


Homicidal Ideation:  No


Homicidal Plan:  No


Homicidal Intention:  No


Insight:  Fair


Judgment:  Impulsive





Results


Vitals/IOs





Vital Signs








  Date Time  Temp Pulse Resp B/P (MAP) Pulse Ox O2 Delivery O2 Flow Rate FiO2


 


6/10/18 06:08 98.2 71 16 155/72 (99) 93   











Assessment & Plan


Problem List:  


(1) Unspecified psychosis


ICD Codes:  F29 - Unspecified psychosis not due to a substance or known 

physiological condition


Assessment & Plan


Pt improving. Continue current tx plan. Estimated LOS:  days


Justification for Cont. Inpt.


risk of decompenation











Meseret Monique MD Otoniel 10, 2018 11:54

## 2018-06-11 VITALS
TEMPERATURE: 97.7 F | SYSTOLIC BLOOD PRESSURE: 105 MMHG | OXYGEN SATURATION: 97 % | RESPIRATION RATE: 16 BRPM | HEART RATE: 63 BPM | DIASTOLIC BLOOD PRESSURE: 55 MMHG

## 2018-06-11 VITALS
OXYGEN SATURATION: 93 % | HEART RATE: 98 BPM | DIASTOLIC BLOOD PRESSURE: 66 MMHG | RESPIRATION RATE: 16 BRPM | SYSTOLIC BLOOD PRESSURE: 124 MMHG | TEMPERATURE: 98.1 F

## 2018-06-11 RX ADMIN — FLUTICASONE PROPIONATE SCH SPRAY: 50 SPRAY, METERED NASAL at 08:42

## 2018-06-11 RX ADMIN — LISINOPRIL SCH MG: 5 TABLET ORAL at 09:00

## 2018-06-11 RX ADMIN — FLUTICASONE PROPIONATE SCH SPRAY: 50 SPRAY, METERED NASAL at 20:00

## 2018-06-11 RX ADMIN — HYDROCODONE BITARTRATE AND ACETAMINOPHEN PRN TAB: 5; 325 TABLET ORAL at 08:58

## 2018-06-11 RX ADMIN — BACLOFEN SCH MG: 10 TABLET ORAL at 08:41

## 2018-06-11 RX ADMIN — PRAVASTATIN SODIUM SCH MG: 40 TABLET ORAL at 08:41

## 2018-06-11 RX ADMIN — ESCITALOPRAM OXALATE SCH MG: 10 TABLET, FILM COATED ORAL at 08:41

## 2018-06-11 RX ADMIN — METOPROLOL TARTRATE SCH MG: 25 TABLET, FILM COATED ORAL at 09:00

## 2018-06-11 RX ADMIN — METOPROLOL TARTRATE SCH MG: 25 TABLET, FILM COATED ORAL at 20:01

## 2018-06-11 RX ADMIN — ACETAMINOPHEN PRN MG: 325 TABLET ORAL at 02:20

## 2018-06-11 RX ADMIN — ACETAMINOPHEN PRN MG: 325 TABLET ORAL at 20:00

## 2018-06-11 RX ADMIN — BACLOFEN SCH MG: 10 TABLET ORAL at 20:00

## 2018-06-11 NOTE — PD.TTN
Patient Problems


1. Discharge planning


2. Medication compliance


3. Knowledge deficit


4. Lack of coping skills





Progress Toward Goals


Provider Present:  Dr. GREY Lundy


Provider Input:  


6/11/18 patient is overall stable and compliant 


6/6/18Pending DC to [place]


Nurse(s) Present:  Mj


Nurse(s) Input:  


6/11/18 Joe: patient is compliant and well, engaging and out for meals 


6/6/18Pt is cooperative, pleasant, social, has concerns about Cancer


Psychiatric Counselors Present:  Gilda Marte LCSW, Meme Brenner, [facility], 

Eugene Olivares Jr., [facility], Mandy Arora, [facility]


Psych Therapist Input:  


6/11/18 asked for Cecy Booth to work with family and hospital on


placement


Group Spec/RT/OT/ALVAREZ Present:  KIM Mcnamara, Cesar Rivas, OT


Group Spec/RT/OT/ALVAREZ Input:  


6/11/18 attends select groups 


6/6/18Pt attends select groups on unit. Remains isolated to room.











Gilda Marte LCSW Jun 11, 2018 15:16

## 2018-06-11 NOTE — HHI.PYPN
Subjective


Remarks


Patient seen for follow-up, chart reviewed.  Discussion nursing staff reported 

the patient has been pleasant and cooperative and made compliant.  Patient was 

found sitting hospital bed noted B, cooperative.  Patient states a week had 

gone "okay" continues report having some discomfort in her left knee when 

ambulating but tolerable.  Patient states he is eating and drinking well, had a 

visit with her daughter which went "pretty good" states that she is feeling 

less depressed now with current antidepressant.





Review of Systems


Except as stated in HPI:  all other systems reviewed are Neg





Mental Status Examination


Appearance:  Appropriate


Consciousness:  Alert


Orientation:  x4


Motor Activity:  Other (No motor abnormalities noted)


Speech:  Unremarkable


Language:  Adequate


Fund of Knowledge:  Adequate


Attention and Concentration:  Adequate


Memory:  Unremarkable (Grossly intact on clinical exam)


Mood:  Appropriate


Affect:  Appropriate


Thought Process & Associations:  Intact


Thought Content:  Appropriate


Hallucination Type:  None


Delusion Type:  Paranoid (mild)


Suicidal Ideation:  No


Suicidal Plan:  No


Suicidal Intention:  No


Homicidal Ideation:  No


Homicidal Plan:  No


Homicidal Intention:  No


Insight:  Fair


Judgment:  Impulsive





Results


Vitals/IOs





Vital Signs








  Date Time  Temp Pulse Resp B/P (MAP) Pulse Ox O2 Delivery O2 Flow Rate FiO2


 


6/11/18 06:11 97.7 63 16 105/55 (72) 97   














Intake and Output   


 


 6/11/18 6/11/18 6/12/18





 08:00 16:00 00:00


 


Intake Total 240 ml 240 ml 


 


Balance 240 ml 240 ml 











Assessment & Plan


Problem List:  


(1) Unspecified psychosis


ICD Codes:  F29 - Unspecified psychosis not due to a substance or known 

physiological condition


Assessment & Plan


Patient currently with stable mood, feeling less depressed and hopeful, denying 

suicide ideations.  We will continue current treatment.  We will continue 

recommendations as per prior medical team as well as oncology consultation.  

Continue to monitor mood and behavior.  Discharge planning in progress.


Justification for Cont. Inpt.


At risk for further decompensation if at lower level of care


Discharge Planning


Patient currently waiting referral to safe disposition











Daniel Lundy MD Jun 11, 2018 17:18

## 2018-06-12 VITALS
DIASTOLIC BLOOD PRESSURE: 68 MMHG | OXYGEN SATURATION: 93 % | HEART RATE: 63 BPM | RESPIRATION RATE: 17 BRPM | SYSTOLIC BLOOD PRESSURE: 143 MMHG

## 2018-06-12 VITALS
TEMPERATURE: 98 F | OXYGEN SATURATION: 94 % | SYSTOLIC BLOOD PRESSURE: 123 MMHG | RESPIRATION RATE: 16 BRPM | DIASTOLIC BLOOD PRESSURE: 58 MMHG | HEART RATE: 56 BPM

## 2018-06-12 VITALS
RESPIRATION RATE: 16 BRPM | HEART RATE: 86 BPM | TEMPERATURE: 98.4 F | SYSTOLIC BLOOD PRESSURE: 126 MMHG | OXYGEN SATURATION: 94 % | DIASTOLIC BLOOD PRESSURE: 71 MMHG

## 2018-06-12 LAB
ALBUMIN SERPL-MCNC: 3.4 GM/DL (ref 3.4–5)
ALP SERPL-CCNC: 96 U/L (ref 45–117)
ALT SERPL-CCNC: 18 U/L (ref 10–53)
AST SERPL-CCNC: 23 U/L (ref 15–37)
BASOPHILS # BLD AUTO: 0.1 TH/MM3 (ref 0–0.2)
BASOPHILS NFR BLD: 1.1 % (ref 0–2)
BILIRUB SERPL-MCNC: 0.3 MG/DL (ref 0.2–1)
BUN SERPL-MCNC: 37 MG/DL (ref 7–18)
CALCIUM SERPL-MCNC: 8.9 MG/DL (ref 8.5–10.1)
CHLORIDE SERPL-SCNC: 104 MEQ/L (ref 98–107)
CREAT SERPL-MCNC: 1.19 MG/DL (ref 0.5–1)
EOSINOPHIL # BLD: 0.2 TH/MM3 (ref 0–0.4)
EOSINOPHIL NFR BLD: 2.6 % (ref 0–4)
ERYTHROCYTE [DISTWIDTH] IN BLOOD BY AUTOMATED COUNT: 14.2 % (ref 11.6–17.2)
GFR SERPLBLD BASED ON 1.73 SQ M-ARVRAT: 45 ML/MIN (ref 89–?)
GLUCOSE SERPL-MCNC: 93 MG/DL (ref 74–106)
HCO3 BLD-SCNC: 24.8 MEQ/L (ref 21–32)
HCT VFR BLD CALC: 36.2 % (ref 35–46)
HGB BLD-MCNC: 12 GM/DL (ref 11.6–15.3)
LYMPHOCYTES # BLD AUTO: 2 TH/MM3 (ref 1–4.8)
LYMPHOCYTES NFR BLD AUTO: 23.2 % (ref 9–44)
MCH RBC QN AUTO: 29.5 PG (ref 27–34)
MCHC RBC AUTO-ENTMCNC: 33.3 % (ref 32–36)
MCV RBC AUTO: 88.8 FL (ref 80–100)
MONOCYTE #: 0.9 TH/MM3 (ref 0–0.9)
MONOCYTES NFR BLD: 10.4 % (ref 0–8)
NEUTROPHILS # BLD AUTO: 5.4 TH/MM3 (ref 1.8–7.7)
NEUTROPHILS NFR BLD AUTO: 62.7 % (ref 16–70)
PLATELET # BLD: 311 TH/MM3 (ref 150–450)
PMV BLD AUTO: 9.3 FL (ref 7–11)
PROT SERPL-MCNC: 7.2 GM/DL (ref 6.4–8.2)
RBC # BLD AUTO: 4.08 MIL/MM3 (ref 4–5.3)
SODIUM SERPL-SCNC: 139 MEQ/L (ref 136–145)
WBC # BLD AUTO: 8.6 TH/MM3 (ref 4–11)

## 2018-06-12 RX ADMIN — HYDROCODONE BITARTRATE AND ACETAMINOPHEN PRN TAB: 5; 325 TABLET ORAL at 00:42

## 2018-06-12 RX ADMIN — HYDROCODONE BITARTRATE AND ACETAMINOPHEN PRN TAB: 5; 325 TABLET ORAL at 18:20

## 2018-06-12 RX ADMIN — BACLOFEN SCH MG: 10 TABLET ORAL at 08:29

## 2018-06-12 RX ADMIN — ESCITALOPRAM OXALATE SCH MG: 10 TABLET, FILM COATED ORAL at 08:30

## 2018-06-12 RX ADMIN — BACLOFEN SCH MG: 10 TABLET ORAL at 20:24

## 2018-06-12 RX ADMIN — METOPROLOL TARTRATE SCH MG: 25 TABLET, FILM COATED ORAL at 20:24

## 2018-06-12 RX ADMIN — LISINOPRIL SCH MG: 5 TABLET ORAL at 08:30

## 2018-06-12 RX ADMIN — PRAVASTATIN SODIUM SCH MG: 40 TABLET ORAL at 08:30

## 2018-06-12 RX ADMIN — FLUTICASONE PROPIONATE SCH SPRAY: 50 SPRAY, METERED NASAL at 20:24

## 2018-06-12 RX ADMIN — METOPROLOL TARTRATE SCH MG: 25 TABLET, FILM COATED ORAL at 08:30

## 2018-06-12 RX ADMIN — FLUTICASONE PROPIONATE SCH SPRAY: 50 SPRAY, METERED NASAL at 08:29

## 2018-06-12 NOTE — HHI.PYPN
Subjective


Remarks


Patient seen for follow, chart reviewed.  Discussion nursing staff reported 

patient, cooperative has been in contact with daughter regards to assistance to 

help find her adequate place for her residence.  Patient was found in blade on 

unit noted [facility].  Patient states that she had been sleeping well, her 

mood has been okay, continues with left knee pain.  Patient states she was seen 

by oncology and has her labs drawn and will await recommendations.  She states 

that she looks forward to visiting with her daughter later today.  Denying any 

SI or HI.





Review of Systems


Except as stated in HPI:  all other systems reviewed are Neg





Mental Status Examination


Appearance:  Appropriate


Consciousness:  Alert


Orientation:  x4


Motor Activity:  Other (No motor abnormalities noted)


Speech:  Unremarkable


Language:  Adequate


Fund of Knowledge:  Adequate


Attention and Concentration:  Adequate


Memory:  Unremarkable (Grossly intact on clinical exam)


Mood:  Appropriate


Affect:  Appropriate


Thought Process & Associations:  Intact


Thought Content:  Appropriate


Hallucination Type:  None


Delusion Type:  Paranoid (mild)


Suicidal Ideation:  No


Suicidal Plan:  No


Suicidal Intention:  No


Homicidal Ideation:  No


Homicidal Plan:  No


Homicidal Intention:  No


Insight:  Fair


Judgment:  Impulsive





Results


Labs


Labs reviewed








Test


  6/12/18


11:35


 


White Blood Count 8.6 TH/MM3 


 


Red Blood Count 4.08 MIL/MM3 


 


Hemoglobin 12.0 GM/DL 


 


Hematocrit 36.2 % 


 


Mean Corpuscular Volume 88.8 FL 


 


Mean Corpuscular Hemoglobin 29.5 PG 


 


Mean Corpuscular Hemoglobin


Concent 33.3 % 


 


 


Red Cell Distribution Width 14.2 % 


 


Platelet Count 311 TH/MM3 


 


Mean Platelet Volume 9.3 FL 


 


Neutrophils (%) (Auto) 62.7 % 


 


Lymphocytes (%) (Auto) 23.2 % 


 


Monocytes (%) (Auto) 10.4 % 


 


Eosinophils (%) (Auto) 2.6 % 


 


Basophils (%) (Auto) 1.1 % 


 


Neutrophils # (Auto) 5.4 TH/MM3 


 


Lymphocytes # (Auto) 2.0 TH/MM3 


 


Monocytes # (Auto) 0.9 TH/MM3 


 


Eosinophils # (Auto) 0.2 TH/MM3 


 


Basophils # (Auto) 0.1 TH/MM3 


 


CBC Comment DIFF FINAL 


 


Differential Comment  


 


Blood Urea Nitrogen 37 MG/DL 


 


Creatinine 1.19 MG/DL 


 


Random Glucose 93 MG/DL 


 


Total Protein 7.2 GM/DL 


 


Albumin 3.4 GM/DL 


 


Calcium Level 8.9 MG/DL 


 


Alkaline Phosphatase 96 U/L 


 


Aspartate Amino Transf


(AST/SGOT) 23 U/L 


 


 


Alanine Aminotransferase


(ALT/SGPT) 18 U/L 


 


 


Total Bilirubin 0.3 MG/DL 


 


Sodium Level 139 MEQ/L 


 


Potassium Level 4.6 MEQ/L 


 


Chloride Level 104 MEQ/L 


 


Carbon Dioxide Level 24.8 MEQ/L 


 


Anion Gap 10 MEQ/L 


 


Estimat Glomerular Filtration


Rate 45 ML/MIN 


 








Vitals/IOs





Vital Signs








  Date Time  Temp Pulse Resp B/P (MAP) Pulse Ox O2 Delivery O2 Flow Rate FiO2


 


6/12/18 06:09 98.0 56 16 123/58 (79) 94   














Intake and Output   


 


 6/12/18 6/12/18 6/13/18





 08:00 16:00 00:00


 


Intake Total  1200 ml 


 


Balance  1200 ml 











Assessment & Plan


Problem List:  


(1) Unspecified psychosis


ICD Codes:  F29 - Unspecified psychosis not due to a substance or known 

physiological condition


Assessment & Plan


Patient this time continues with stable mood, feeling less depressed, denying 

suicidal ideations and future oriented.  Continue current treatment.  We will 

continue recommendations as per primary medical and oncology team.  Discharge 

planning in progress.


Justification for Cont. Inpt.


At risk for further decompensation if at lower level of care


Discharge Planning


To be determined.











Daniel Lundy MD Jun 12, 2018 15:53

## 2018-06-12 NOTE — PD.ONC.PN
Subjective


Subjective


Remarks


Afebrile overnight. 


Patient states she feels fatigued today. 


she is hoping to get back on her tasigna soon. 


she also states she has had a very healthy appetite since being in the 

hospital. 


denies nausea or vomiting.





Objective


Data











  Date Time  Temp Pulse Resp B/P (MAP) Pulse Ox O2 Delivery O2 Flow Rate FiO2


 


6/12/18 06:09 98.0 56 16 123/58 (79) 94   


 


6/12/18 00:39  63 17 143/68 (93) 93   


 


6/11/18 17:28 98.1 98 16 124/66 (85) 93   














 6/12/18 6/12/18 6/12/18





 07:00 15:00 23:00


 


Intake Total  600 ml 


 


Balance  600 ml 











Administered Medications








 Medications


  (Trade)  Dose


 Ordered  Sig/Randall


 Route


 PRN Reason  Start Time


 Stop Time Status Last Admin


Dose Admin


 


 Lisinopril


  (Prinivil)  5 mg  DAILY


 PO


   5/24/18 10:00


    6/12/18 08:30


 


 


 Metoprolol


 Tartrate


  (Lopressor)  25 mg  BID


 PO


   5/24/18 21:00


    6/12/18 08:30


 


 


 Lorazepam


  (Ativan)  0.5 mg  Q12H  PRN


 PO


 MODERATE TO SEVERE ANXIETY  5/24/18 10:15


    6/2/18 23:37


 


 


 Acetaminophen


  (Tylenol)  650 mg  Q4H  PRN


 PO


 Pain 1-4 or Temp >101F, HA  5/24/18 10:15


    6/11/18 20:00


 


 


 Lorazepam


  (Ativan)  1 mg  Q4H  PRN


 PO


 CIWA 8 - 10  5/24/18 10:15


    6/1/18 17:22


 


 


 Quetiapine


 Fumarate


  (SEROquel)  25 mg  BID@09,12


 PO


   5/24/18 12:00


    6/12/18 08:29


 


 


 Clonazepam


  (KlonoPIN)  0.5 mg  Q8HR


 PO


   5/24/18 14:00


    6/12/18 05:22


 


 


 Pravastatin Sodium


  (Pravachol)  40 mg  DAILY


 PO


   5/27/18 09:00


    6/12/18 08:30


 


 


 Fluticasone


 Propionate


  (Flonase Eyal Spr)  1 spray  BID


 NASAL


   5/27/18 14:30


    6/12/18 08:29


 


 


 Acetaminophen/


 Hydrocodone Bitart


  (Norco  5-325 Mg)  1 tab  Q8HR  PRN


 PO


 Pain 5-10  6/4/18 15:15


    6/12/18 00:42


 


 


 Baclofen


  (Lioresal)  10 mg  BID


 PO


   6/4/18 21:00


    6/12/18 08:29


 


 


 Escitalopram


 Oxalate


  (Lexapro)  10 mg  DAILY


 PO


   6/5/18 12:30


    6/12/18 08:30


 








Objective Remarks


GENERAL: Pleasant middle aged obese female, sitting up on side of bed in nad. 


SKIN: Warm and dry.


HEAD: Normocephalic.


EYES: No injection or drainage. 


NECK: Supple, trachea midline. 


CARDIOVASCULAR: Regular rate and rhythm


RESPIRATORY: Breath sounds equal bilaterally. No accessory muscle use.


GASTROINTESTINAL: Abdomen soft, non-tender, nondistended. 


EXTREMITIES: No cyanosis


NEUROLOGICAL: awake and alert. normal speech. moving extremities.





Assessment/Plan


Problem List:  


(1) CML (chronic myeloid leukemia)


ICD Codes:  C92.10 - Chronic myeloid leukemia, BCR/ABL-positive, not having 

achieved remission


Plan:  ++on Tasigna previously.  has been off of it since 2/2018 when she ran 

out of insurance. 


--follows up with Dr. Helton at [facility] since diagnosis of CML 

in 10/2017


--records in chart





Assessment


68-year-old female seen in the psychiatric unit; oncology consulted for history 

of questionable CML.


Plan


1. check labs today


2. supportive care


3. patient will need to follow up with her hematologist on discharge from 

hospital.











Hawa Ngo Jun 12, 2018 10:00

## 2018-06-13 VITALS
HEART RATE: 58 BPM | OXYGEN SATURATION: 93 % | RESPIRATION RATE: 17 BRPM | SYSTOLIC BLOOD PRESSURE: 126 MMHG | DIASTOLIC BLOOD PRESSURE: 63 MMHG | TEMPERATURE: 98.3 F

## 2018-06-13 VITALS
RESPIRATION RATE: 17 BRPM | OXYGEN SATURATION: 92 % | HEART RATE: 67 BPM | DIASTOLIC BLOOD PRESSURE: 58 MMHG | SYSTOLIC BLOOD PRESSURE: 112 MMHG | TEMPERATURE: 98.9 F

## 2018-06-13 RX ADMIN — METOPROLOL TARTRATE SCH MG: 25 TABLET, FILM COATED ORAL at 21:02

## 2018-06-13 RX ADMIN — HYDROCODONE BITARTRATE AND ACETAMINOPHEN PRN TAB: 5; 325 TABLET ORAL at 22:22

## 2018-06-13 RX ADMIN — LISINOPRIL SCH MG: 5 TABLET ORAL at 09:22

## 2018-06-13 RX ADMIN — FLUTICASONE PROPIONATE SCH SPRAY: 50 SPRAY, METERED NASAL at 21:00

## 2018-06-13 RX ADMIN — METOPROLOL TARTRATE SCH MG: 25 TABLET, FILM COATED ORAL at 09:00

## 2018-06-13 RX ADMIN — HYDROCODONE BITARTRATE AND ACETAMINOPHEN PRN TAB: 5; 325 TABLET ORAL at 06:29

## 2018-06-13 RX ADMIN — FLUTICASONE PROPIONATE SCH SPRAY: 50 SPRAY, METERED NASAL at 09:23

## 2018-06-13 RX ADMIN — BACLOFEN SCH MG: 10 TABLET ORAL at 21:01

## 2018-06-13 RX ADMIN — PRAVASTATIN SODIUM SCH MG: 40 TABLET ORAL at 09:22

## 2018-06-13 RX ADMIN — ESCITALOPRAM OXALATE SCH MG: 10 TABLET, FILM COATED ORAL at 09:22

## 2018-06-13 RX ADMIN — BACLOFEN SCH MG: 10 TABLET ORAL at 09:22

## 2018-06-13 NOTE — HHI.PYPN
Subjective


Remarks


Reviewed electronic medical records and discussed case with staff.  Follow-up 

was conducted in patient's room.  She was found lying in bed awake and alert.  

She states that she feels "okay".  She reports that she has been sleeping well 

and her appetite has been good.  She has multiple somatic complaints.  I 

explained that she should address these with her medical team.  She reports 

that she is "anxious about where I am going to live once I am discharged".





Mental Status Examination


Appearance:  Appropriate


Consciousness:  Alert


Orientation:  x4


Motor Activity:  Other (No motor abnormalities noted)


Speech:  Unremarkable


Language:  Adequate


Fund of Knowledge:  Adequate


Attention and Concentration:  Adequate


Memory:  Unremarkable (Grossly intact on clinical exam)


Mood:  Appropriate


Affect:  Appropriate


Thought Process & Associations:  Intact


Thought Content:  Appropriate


Hallucination Type:  None


Delusion Type:  Paranoid (mild)


Suicidal Ideation:  No


Suicidal Plan:  No


Suicidal Intention:  No


Homicidal Ideation:  No


Homicidal Plan:  No


Homicidal Intention:  No


Insight:  Fair


Judgment:  Impulsive





Results


Vitals/IOs





Vital Signs








  Date Time  Temp Pulse Resp B/P (MAP) Pulse Ox O2 Delivery O2 Flow Rate FiO2


 


6/13/18 18:01 98.9 67 17 112/58 (76) 92   














Intake and Output   


 


 6/13/18 6/13/18 6/14/18





 08:00 16:00 00:00


 


Intake Total 720 ml 1920 ml 480 ml


 


Balance 720 ml 1920 ml 480 ml











Assessment & Plan


Problem List:  


(1) Unspecified psychosis


ICD Codes:  F29 - Unspecified psychosis not due to a substance or known 

physiological condition


Assessment & Plan


Estimated LOS: Continue with current treatment plan a safe discharge is worked 

on.  Days


Justification for Cont. Inpt.


Moving this patient to a lower level of care would likely result in 

decompensation.











Selma Reeves Jun 13, 2018 20:15

## 2018-06-14 VITALS
OXYGEN SATURATION: 96 % | TEMPERATURE: 98 F | DIASTOLIC BLOOD PRESSURE: 59 MMHG | SYSTOLIC BLOOD PRESSURE: 109 MMHG | RESPIRATION RATE: 17 BRPM | HEART RATE: 59 BPM

## 2018-06-14 VITALS — RESPIRATION RATE: 16 BRPM | DIASTOLIC BLOOD PRESSURE: 68 MMHG | SYSTOLIC BLOOD PRESSURE: 146 MMHG | HEART RATE: 61 BPM

## 2018-06-14 VITALS
DIASTOLIC BLOOD PRESSURE: 59 MMHG | RESPIRATION RATE: 16 BRPM | OXYGEN SATURATION: 96 % | TEMPERATURE: 97.4 F | SYSTOLIC BLOOD PRESSURE: 125 MMHG | HEART RATE: 59 BPM

## 2018-06-14 RX ADMIN — BACLOFEN SCH MG: 10 TABLET ORAL at 21:00

## 2018-06-14 RX ADMIN — ACETAMINOPHEN PRN MG: 325 TABLET ORAL at 21:02

## 2018-06-14 RX ADMIN — ESCITALOPRAM OXALATE SCH MG: 10 TABLET, FILM COATED ORAL at 09:19

## 2018-06-14 RX ADMIN — FLUTICASONE PROPIONATE SCH SPRAY: 50 SPRAY, METERED NASAL at 09:19

## 2018-06-14 RX ADMIN — HYDROCODONE BITARTRATE AND ACETAMINOPHEN PRN TAB: 5; 325 TABLET ORAL at 16:12

## 2018-06-14 RX ADMIN — PRAVASTATIN SODIUM SCH MG: 40 TABLET ORAL at 09:19

## 2018-06-14 RX ADMIN — METOPROLOL TARTRATE SCH MG: 25 TABLET, FILM COATED ORAL at 09:19

## 2018-06-14 RX ADMIN — ACETAMINOPHEN PRN MG: 325 TABLET ORAL at 06:23

## 2018-06-14 RX ADMIN — METOPROLOL TARTRATE SCH MG: 25 TABLET, FILM COATED ORAL at 21:00

## 2018-06-14 RX ADMIN — BACLOFEN SCH MG: 10 TABLET ORAL at 09:19

## 2018-06-14 RX ADMIN — FLUTICASONE PROPIONATE SCH SPRAY: 50 SPRAY, METERED NASAL at 21:01

## 2018-06-14 RX ADMIN — LISINOPRIL SCH MG: 5 TABLET ORAL at 09:19

## 2018-06-14 NOTE — HHI.PYPN
Subjective


Remarks


Patient seen in her room with medical student Brenda, chart reviewed, patient 

calm and cooperative with me somewhat somatic but vaguely and diffusely.  

Overall stating that she is feeling okay but she is concerned about the 

possibility of issues with his COPD.  She denies suicidality voices or visions.





Review of Systems


Except as stated in HPI:  all other systems reviewed are Neg





Mental Status Examination


Appearance:  Appropriate


Consciousness:  Alert


Orientation:  x4


Motor Activity:  Other (No motor abnormalities noted)


Speech:  Unremarkable


Language:  Adequate


Fund of Knowledge:  Adequate


Attention and Concentration:  Adequate


Memory:  Unremarkable (Grossly intact on clinical exam)


Mood:  Appropriate


Affect:  Appropriate


Thought Process & Associations:  Intact


Thought Content:  Appropriate


Hallucination Type:  None


Delusion Type:  Paranoid (mild)


Suicidal Ideation:  No


Suicidal Plan:  No


Suicidal Intention:  No


Homicidal Ideation:  No


Homicidal Plan:  No


Homicidal Intention:  No


Insight:  Fair


Judgment:  Impulsive





Results


Vitals/IOs





Vital Signs








  Date Time  Temp Pulse Resp B/P (MAP) Pulse Ox O2 Delivery O2 Flow Rate FiO2


 


6/14/18 07:23   16     


 


6/14/18 06:11 98.0 59  109/59 (76) 96   














Intake and Output   


 


 6/14/18 6/14/18 6/15/18





 08:00 16:00 00:00


 


Intake Total  720 ml 


 


Balance  720 ml 











Assessment & Plan


Problem List:  


(1) Unspecified psychosis


ICD Codes:  F29 - Unspecified psychosis not due to a substance or known 

physiological condition


Assessment & Plan


Estimated LOS:  days patient calm and cooperative at this time showing some 

mild paranoia and perhaps delusions.  But overall no significant signs of 

psychosis at this time.


Justification for Cont. Inpt.


At this time patient would decompensate a place a lower level of care


Discharge Planning


To be determined











Rupesh Mazariegos MD Jun 14, 2018 17:39

## 2018-06-15 VITALS
OXYGEN SATURATION: 95 % | SYSTOLIC BLOOD PRESSURE: 122 MMHG | HEART RATE: 52 BPM | TEMPERATURE: 98.1 F | RESPIRATION RATE: 16 BRPM | DIASTOLIC BLOOD PRESSURE: 61 MMHG

## 2018-06-15 RX ADMIN — ACETAMINOPHEN PRN MG: 325 TABLET ORAL at 06:12

## 2018-06-15 RX ADMIN — LISINOPRIL SCH MG: 5 TABLET ORAL at 09:32

## 2018-06-15 RX ADMIN — BACLOFEN SCH MG: 10 TABLET ORAL at 19:56

## 2018-06-15 RX ADMIN — METOPROLOL TARTRATE SCH MG: 25 TABLET, FILM COATED ORAL at 09:32

## 2018-06-15 RX ADMIN — HYDROCODONE BITARTRATE AND ACETAMINOPHEN PRN TAB: 5; 325 TABLET ORAL at 00:40

## 2018-06-15 RX ADMIN — FLUTICASONE PROPIONATE SCH SPRAY: 50 SPRAY, METERED NASAL at 09:32

## 2018-06-15 RX ADMIN — METOPROLOL TARTRATE SCH MG: 25 TABLET, FILM COATED ORAL at 19:56

## 2018-06-15 RX ADMIN — FLUTICASONE PROPIONATE SCH SPRAY: 50 SPRAY, METERED NASAL at 19:56

## 2018-06-15 RX ADMIN — ESCITALOPRAM OXALATE SCH MG: 10 TABLET, FILM COATED ORAL at 09:32

## 2018-06-15 RX ADMIN — Medication PRN LOZENGE: at 21:10

## 2018-06-15 RX ADMIN — PRAVASTATIN SODIUM SCH MG: 40 TABLET ORAL at 09:32

## 2018-06-15 RX ADMIN — BACLOFEN SCH MG: 10 TABLET ORAL at 09:32

## 2018-06-15 RX ADMIN — HYDROCODONE BITARTRATE AND ACETAMINOPHEN PRN TAB: 5; 325 TABLET ORAL at 15:35

## 2018-06-15 NOTE — HHI.PYPN
Subjective


Remarks


Reviewed electronic medical records, labs and discussed case with staff.  Follow

-up was performed in patient's room.  Patient was found lying in bed awake, 

alert, and oriented 4.  She reports that she is slept well and her appetite 

has been "good".  She reports that she is feeling "better spirits today "





Mental Status Examination


Appearance:  Appropriate


Consciousness:  Alert


Orientation:  x4


Motor Activity:  Other (No motor abnormalities noted)


Speech:  Unremarkable


Language:  Adequate


Fund of Knowledge:  Adequate


Attention and Concentration:  Adequate


Memory:  Unremarkable (Grossly intact on clinical exam)


Mood:  Appropriate


Affect:  Appropriate


Thought Process & Associations:  Intact


Thought Content:  Appropriate


Hallucination Type:  None


Delusion Type:  Paranoid (mild)


Suicidal Ideation:  No


Suicidal Plan:  No


Suicidal Intention:  No


Homicidal Ideation:  No


Homicidal Plan:  No


Homicidal Intention:  No


Insight:  Fair


Judgment:  Impulsive





Results


Vitals/IOs





Vital Signs








  Date Time  Temp Pulse Resp B/P (MAP) Pulse Ox O2 Delivery O2 Flow Rate FiO2


 


6/15/18 05:43 98.1 52 16 122/61 (81) 95   














Intake and Output   


 


 6/15/18 6/15/18 6/16/18





 08:00 16:00 00:00


 


Intake Total 120 ml 1920 ml 


 


Balance 120 ml 1920 ml 











Assessment & Plan


Problem List:  


(1) Unspecified psychosis


ICD Codes:  F29 - Unspecified psychosis not due to a substance or known 

physiological condition


Assessment & Plan


Estimated LOS: Continue with current treatment plan.  Discharge planning in 

progress.  Days


Justification for Cont. Inpt.


Review this patient to a lower level of care would likely result in 

decompensation.











Selma Reeves Otoniel 15, 2018 15:56

## 2018-06-16 VITALS
DIASTOLIC BLOOD PRESSURE: 62 MMHG | OXYGEN SATURATION: 94 % | SYSTOLIC BLOOD PRESSURE: 104 MMHG | RESPIRATION RATE: 20 BRPM | HEART RATE: 64 BPM

## 2018-06-16 VITALS — HEART RATE: 70 BPM | SYSTOLIC BLOOD PRESSURE: 125 MMHG | DIASTOLIC BLOOD PRESSURE: 71 MMHG

## 2018-06-16 VITALS
RESPIRATION RATE: 16 BRPM | DIASTOLIC BLOOD PRESSURE: 54 MMHG | HEART RATE: 72 BPM | SYSTOLIC BLOOD PRESSURE: 112 MMHG | TEMPERATURE: 97.6 F

## 2018-06-16 VITALS
DIASTOLIC BLOOD PRESSURE: 55 MMHG | RESPIRATION RATE: 16 BRPM | OXYGEN SATURATION: 92 % | TEMPERATURE: 97.5 F | HEART RATE: 52 BPM | SYSTOLIC BLOOD PRESSURE: 112 MMHG

## 2018-06-16 RX ADMIN — HYDROCODONE BITARTRATE AND ACETAMINOPHEN PRN TAB: 5; 325 TABLET ORAL at 06:54

## 2018-06-16 RX ADMIN — METOPROLOL TARTRATE SCH MG: 25 TABLET, FILM COATED ORAL at 20:33

## 2018-06-16 RX ADMIN — FLUTICASONE PROPIONATE SCH SPRAY: 50 SPRAY, METERED NASAL at 09:03

## 2018-06-16 RX ADMIN — BACLOFEN SCH MG: 10 TABLET ORAL at 20:34

## 2018-06-16 RX ADMIN — HYDROCODONE BITARTRATE AND ACETAMINOPHEN PRN TAB: 5; 325 TABLET ORAL at 18:45

## 2018-06-16 RX ADMIN — ACETAMINOPHEN PRN MG: 325 TABLET ORAL at 23:04

## 2018-06-16 RX ADMIN — METOPROLOL TARTRATE SCH MG: 25 TABLET, FILM COATED ORAL at 09:03

## 2018-06-16 RX ADMIN — Medication PRN LOZENGE: at 18:45

## 2018-06-16 RX ADMIN — LISINOPRIL SCH MG: 5 TABLET ORAL at 09:03

## 2018-06-16 RX ADMIN — BACLOFEN SCH MG: 10 TABLET ORAL at 09:03

## 2018-06-16 RX ADMIN — ESCITALOPRAM OXALATE SCH MG: 10 TABLET, FILM COATED ORAL at 09:03

## 2018-06-16 RX ADMIN — FLUTICASONE PROPIONATE SCH SPRAY: 50 SPRAY, METERED NASAL at 20:33

## 2018-06-16 RX ADMIN — PRAVASTATIN SODIUM SCH MG: 40 TABLET ORAL at 09:03

## 2018-06-16 NOTE — HHI.PYPN
Subjective


Remarks


Patient was seen and case discussed with nursing.  Mood today 7 out of 10.  

Affect is anxious.  Eating and sleeping well.  Compliant with medications.  

Behaving well on the unit





Mental Status Examination


Appearance:  Appropriate


Consciousness:  Alert


Orientation:  x4


Motor Activity:  Other (No motor abnormalities noted)


Speech:  Unremarkable


Language:  Adequate


Fund of Knowledge:  Adequate


Attention and Concentration:  Adequate


Memory:  Unremarkable (Grossly intact on clinical exam)


Mood:  Appropriate


Affect:  Appropriate


Thought Process & Associations:  Intact


Thought Content:  Appropriate


Hallucination Type:  None


Delusion Type:  Paranoid (mild)


Suicidal Ideation:  No


Suicidal Plan:  No


Suicidal Intention:  No


Homicidal Ideation:  No


Homicidal Plan:  No


Homicidal Intention:  No


Insight:  Fair


Judgment:  Impulsive





Results


Vitals/IOs





Vital Signs








  Date Time  Temp Pulse Resp B/P (MAP) Pulse Ox O2 Delivery O2 Flow Rate FiO2


 


6/16/18 08:18  70  125/71 (89)    


 


6/16/18 05:09 97.5  16  92   














Intake and Output   


 


 6/16/18 6/16/18 6/17/18





 08:00 16:00 00:00


 


Intake Total 120 ml 240 ml 


 


Balance 120 ml 240 ml 











Assessment & Plan


Problem List:  


(1) Unspecified psychosis


ICD Codes:  F29 - Unspecified psychosis not due to a substance or known 

physiological condition


Assessment & Plan


Continue current treatment plan


Justification for Cont. Inpt.


Patient would decompensate in a less restrictive setting











Ricco Sun DO Jun 16, 2018 15:09

## 2018-06-17 VITALS
HEART RATE: 68 BPM | RESPIRATION RATE: 16 BRPM | OXYGEN SATURATION: 94 % | TEMPERATURE: 97.8 F | DIASTOLIC BLOOD PRESSURE: 76 MMHG | SYSTOLIC BLOOD PRESSURE: 163 MMHG

## 2018-06-17 VITALS — HEART RATE: 77 BPM | SYSTOLIC BLOOD PRESSURE: 126 MMHG | DIASTOLIC BLOOD PRESSURE: 60 MMHG

## 2018-06-17 VITALS
TEMPERATURE: 97.9 F | SYSTOLIC BLOOD PRESSURE: 98 MMHG | HEART RATE: 62 BPM | OXYGEN SATURATION: 95 % | DIASTOLIC BLOOD PRESSURE: 50 MMHG | RESPIRATION RATE: 16 BRPM

## 2018-06-17 RX ADMIN — PRAVASTATIN SODIUM SCH MG: 40 TABLET ORAL at 08:28

## 2018-06-17 RX ADMIN — HYDROCODONE BITARTRATE AND ACETAMINOPHEN PRN TAB: 5; 325 TABLET ORAL at 18:34

## 2018-06-17 RX ADMIN — ESCITALOPRAM OXALATE SCH MG: 10 TABLET, FILM COATED ORAL at 08:28

## 2018-06-17 RX ADMIN — METOPROLOL TARTRATE SCH MG: 25 TABLET, FILM COATED ORAL at 08:28

## 2018-06-17 RX ADMIN — FLUTICASONE PROPIONATE SCH SPRAY: 50 SPRAY, METERED NASAL at 08:28

## 2018-06-17 RX ADMIN — Medication PRN LOZENGE: at 18:34

## 2018-06-17 RX ADMIN — FLUTICASONE PROPIONATE SCH SPRAY: 50 SPRAY, METERED NASAL at 20:42

## 2018-06-17 RX ADMIN — HYDROCODONE BITARTRATE AND ACETAMINOPHEN PRN TAB: 5; 325 TABLET ORAL at 08:28

## 2018-06-17 RX ADMIN — Medication PRN LOZENGE: at 04:54

## 2018-06-17 RX ADMIN — LISINOPRIL SCH MG: 5 TABLET ORAL at 08:28

## 2018-06-17 RX ADMIN — METOPROLOL TARTRATE SCH MG: 25 TABLET, FILM COATED ORAL at 21:00

## 2018-06-17 RX ADMIN — METOPROLOL TARTRATE SCH MG: 25 TABLET, FILM COATED ORAL at 20:42

## 2018-06-17 RX ADMIN — BACLOFEN SCH MG: 10 TABLET ORAL at 20:43

## 2018-06-17 RX ADMIN — Medication PRN LOZENGE: at 09:05

## 2018-06-17 RX ADMIN — BACLOFEN SCH MG: 10 TABLET ORAL at 08:28

## 2018-06-17 NOTE — HHI.PYPN
Subjective


Remarks


Patient was seen and case discussed with nursing.  Patient spending her time in 

her room reading.  She is looking forward to leaving but does have concerns 

about new stressors and things she will have to deal with.  Compliant with 

medications.  Denies suicidal or homicidal ideation intent or plan





Mental Status Examination


Appearance:  Appropriate


Consciousness:  Alert


Orientation:  x4


Motor Activity:  Other (No motor abnormalities noted)


Speech:  Unremarkable


Language:  Adequate


Fund of Knowledge:  Adequate


Attention and Concentration:  Adequate


Memory:  Unremarkable (Grossly intact on clinical exam)


Mood:  Appropriate


Affect:  Appropriate


Thought Process & Associations:  Intact


Thought Content:  Appropriate


Hallucination Type:  None


Delusion Type:  Paranoid (mild)


Suicidal Ideation:  No


Suicidal Plan:  No


Suicidal Intention:  No


Homicidal Ideation:  No


Homicidal Plan:  No


Homicidal Intention:  No


Insight:  Fair


Judgment:  Impulsive





Results


Vitals/IOs





Vital Signs








  Date Time  Temp Pulse Resp B/P (MAP) Pulse Ox O2 Delivery O2 Flow Rate FiO2


 


6/17/18 06:00 97.8 68 16 163/76 (105) 94   











Assessment & Plan


Problem List:  


(1) Unspecified psychosis


ICD Codes:  F29 - Unspecified psychosis not due to a substance or known 

physiological condition


Assessment & Plan


Continue current treatment plan


Justification for Cont. Inpt.


Patient would decompensate in a less restrictive setting











Ricco Sun DO Jun 17, 2018 13:21

## 2018-06-18 VITALS
HEART RATE: 74 BPM | RESPIRATION RATE: 16 BRPM | OXYGEN SATURATION: 94 % | DIASTOLIC BLOOD PRESSURE: 65 MMHG | SYSTOLIC BLOOD PRESSURE: 133 MMHG

## 2018-06-18 VITALS
DIASTOLIC BLOOD PRESSURE: 58 MMHG | HEART RATE: 59 BPM | TEMPERATURE: 97.9 F | RESPIRATION RATE: 16 BRPM | OXYGEN SATURATION: 95 % | SYSTOLIC BLOOD PRESSURE: 127 MMHG

## 2018-06-18 VITALS — HEART RATE: 60 BPM | RESPIRATION RATE: 20 BRPM | DIASTOLIC BLOOD PRESSURE: 76 MMHG | SYSTOLIC BLOOD PRESSURE: 184 MMHG

## 2018-06-18 VITALS
TEMPERATURE: 97.1 F | SYSTOLIC BLOOD PRESSURE: 101 MMHG | RESPIRATION RATE: 16 BRPM | HEART RATE: 67 BPM | OXYGEN SATURATION: 94 % | DIASTOLIC BLOOD PRESSURE: 49 MMHG

## 2018-06-18 RX ADMIN — FLUTICASONE PROPIONATE SCH SPRAY: 50 SPRAY, METERED NASAL at 08:00

## 2018-06-18 RX ADMIN — FLUTICASONE PROPIONATE SCH SPRAY: 50 SPRAY, METERED NASAL at 21:02

## 2018-06-18 RX ADMIN — PRAVASTATIN SODIUM SCH MG: 40 TABLET ORAL at 08:00

## 2018-06-18 RX ADMIN — METOPROLOL TARTRATE SCH MG: 25 TABLET, FILM COATED ORAL at 21:00

## 2018-06-18 RX ADMIN — ESCITALOPRAM OXALATE SCH MG: 10 TABLET, FILM COATED ORAL at 08:00

## 2018-06-18 RX ADMIN — HYDROCODONE BITARTRATE AND ACETAMINOPHEN PRN TAB: 5; 325 TABLET ORAL at 06:26

## 2018-06-18 RX ADMIN — BACLOFEN SCH MG: 10 TABLET ORAL at 21:00

## 2018-06-18 RX ADMIN — HYDROCODONE BITARTRATE AND ACETAMINOPHEN PRN TAB: 5; 325 TABLET ORAL at 16:15

## 2018-06-18 RX ADMIN — Medication PRN LOZENGE: at 22:00

## 2018-06-18 RX ADMIN — LISINOPRIL SCH MG: 5 TABLET ORAL at 08:00

## 2018-06-18 RX ADMIN — BACLOFEN SCH MG: 10 TABLET ORAL at 08:00

## 2018-06-18 RX ADMIN — METOPROLOL TARTRATE SCH MG: 25 TABLET, FILM COATED ORAL at 08:00

## 2018-06-18 RX ADMIN — Medication PRN LOZENGE: at 08:27

## 2018-06-18 RX ADMIN — Medication PRN LOZENGE: at 16:15

## 2018-06-18 RX ADMIN — Medication PRN LOZENGE: at 18:31

## 2018-06-18 NOTE — HHI.PYPN
Subjective


Remarks


Reviewed electronic medical record and discussed case with staff.  Follow-up 

was conducted in patient's room.  Patient found lying on the bed reading a 

book.  She reports that she feels "pretty good".  Her mood is good her affect 

is euthymic.  She states that her sleep was a bit restless last night but after 

an Ativan at around 3 AM she slept well afterwards.  She states that her 

appetite is been okay.  Denies any side effects from medication.  Patient has 

been placed at [facility] however, there is a snafu with transportation today.  

Hopeful for discharge tomorrow.





Mental Status Examination


Appearance:  Appropriate


Consciousness:  Alert


Orientation:  x4


Motor Activity:  Other (No motor abnormalities noted)


Speech:  Unremarkable


Language:  Adequate


Fund of Knowledge:  Adequate


Attention and Concentration:  Adequate


Memory:  Unremarkable (Grossly intact on clinical exam)


Mood:  Appropriate


Affect:  Appropriate


Thought Process & Associations:  Intact


Thought Content:  Appropriate


Hallucination Type:  None


Delusion Type:  Paranoid (mild)


Suicidal Ideation:  No


Suicidal Plan:  No


Suicidal Intention:  No


Homicidal Ideation:  No


Homicidal Plan:  No


Homicidal Intention:  No


Insight:  Fair


Judgment:  Impulsive





Results


Vitals/IOs





Vital Signs








  Date Time  Temp Pulse Resp B/P (MAP) Pulse Ox O2 Delivery O2 Flow Rate FiO2


 


6/18/18 18:51 97.1 67 16 101/49 (66) 94   














Intake and Output   


 


 6/18/18 6/18/18 6/19/18





 08:00 16:00 00:00


 


Intake Total 360 ml  920 ml


 


Balance 360 ml  920 ml











Assessment & Plan


Problem List:  


(1) Unspecified psychosis


ICD Codes:  F29 - Unspecified psychosis not due to a substance or known 

physiological condition


Assessment & Plan


Estimated LOS: Patient continues to show improvement.  Placement has been 

identified.  Hopeful for discharge tomorrow.  Days


Justification for Cont. Inpt.


Moving this patient to a lower level of care would likely result in 

decompensation.











Selma Reeves Jun 18, 2018 20:30

## 2018-06-19 VITALS
HEART RATE: 62 BPM | OXYGEN SATURATION: 92 % | TEMPERATURE: 97.7 F | DIASTOLIC BLOOD PRESSURE: 50 MMHG | SYSTOLIC BLOOD PRESSURE: 105 MMHG | RESPIRATION RATE: 16 BRPM

## 2018-06-19 VITALS
OXYGEN SATURATION: 94 % | HEART RATE: 62 BPM | TEMPERATURE: 97.8 F | DIASTOLIC BLOOD PRESSURE: 75 MMHG | RESPIRATION RATE: 16 BRPM | SYSTOLIC BLOOD PRESSURE: 140 MMHG

## 2018-06-19 RX ADMIN — FLUTICASONE PROPIONATE SCH SPRAY: 50 SPRAY, METERED NASAL at 08:29

## 2018-06-19 RX ADMIN — METOPROLOL TARTRATE SCH MG: 25 TABLET, FILM COATED ORAL at 08:30

## 2018-06-19 RX ADMIN — Medication PRN LOZENGE: at 15:45

## 2018-06-19 RX ADMIN — HYDROCODONE BITARTRATE AND ACETAMINOPHEN PRN TAB: 5; 325 TABLET ORAL at 08:30

## 2018-06-19 RX ADMIN — LISINOPRIL SCH MG: 5 TABLET ORAL at 08:30

## 2018-06-19 RX ADMIN — ESCITALOPRAM OXALATE SCH MG: 10 TABLET, FILM COATED ORAL at 08:30

## 2018-06-19 RX ADMIN — BACLOFEN SCH MG: 10 TABLET ORAL at 08:30

## 2018-06-19 RX ADMIN — PRAVASTATIN SODIUM SCH MG: 40 TABLET ORAL at 08:30

## 2018-06-19 NOTE — HHI.DS
Psychiatry Discharge Summary


Inpatient Psychiatric care?:  Yes


Advance Directive:  No


Reason Not Provided:  Due to Patient Condition


Mental Health AdvanceDirective:  No


Health Care Proxy:  No


Admission


Admission Date


May 24, 2018 at 10:07


Admission Diagnosis:  


(1) Unspecified psychosis


ICD Code:  F29 - Unspecified psychosis not due to a substance or known 

physiological condition


Brief History


The patient 68-year-old  woman, domiciled with her granddaughter in 

[facility], , mother of 3 kids, supported by Social Security, psychiatric 

history of alcohol and benzodiazepine use disorder, bipolar disorder, anxiety, 

psychiatric admissions, one suicidal attempt by overdosing, outpatient 

psychiatric care with Dr. Hampton, she is in Lexapro 20 mg, Xanax 2 mg 3 times 

daily, medical history of CML, CAD, CHF, dyslipidemia, hypertension COPD, who 

presents emergency department under [place] act by PD.  Patient had contacted 

police advising them that she was feeling suicidal.  The patient states that 

she merely wanted to get out of the house.  She has no plan on self-harm.  No 

homicidal ideation.  She states that she lives with her granddaughter.  She had 

moved back to the area from [place] after living with her son.  She states 

that she is currently under the care of the cancer doctor at [facility].  

She is being treated for CML.  Patient also has a history of.  Anemia arthritis 

chronic pain syndrome morphine overdose CAD dyslipidemia CHF COPD anxiety 

depression hypertension renal insufficiency myocardial infarction seizure sleep 

apnea D&C tubal ligation .patient denies any toxic ingestions.  EMR was 

reviewed.  Collateral information from her granddaughter Brittany Cruz [phone], was attempted, but unfortunately not obtained at this moment.  Case 

was discussed with ER team.  On psychiatric evaluation the patient is found in 

distress, very anxious stating that she feels scared.  Patient states that if 

you has to go back to what she is living now with her granddaughter she is 

going to kill herself.  She reports that her daughter has been stealing her 

money, stealing her Xanax and talking against her with her family.  The patient 

is very tearful during the evaluation, stating that all her family is now a 

conspiracy "to get me out of my house, to maybe look like a drug addict".  She 

says that her family are recording or her movement with cameras, and as the 

patient states these, she started looking around the room "I know that our, or 

is here, they are recording my behavior to accuse me".  The patient reports 

that her daughter has been stealing her Xanax " and them she comes to me 

accusing me that I am not overusing this medication".  The patient repeats 

multiple times that she prefers to kill herself then going back to her 

granddaughter's house per.  Patient reports symptoms of depression, anxiety, 

she says that she has been feeling restless, with multiple intrusive thoughts 

of her family talking about her, helplessness, hopelessness, suicidal ideation, 

with a plan of overdosing, denies homicidal ideation, she denies visual and 

auditory hallucinations.  Patient is fully oriented 3.  No attention deficit, 

no fluctuation of consciousness.  The patient denies the use of alcohol.  She 

reports that she was an alcoholic in the past but she has being over a year 

sober.  She denies the use of illegal drugs. 





05/25/18 -second opinion 


Patient is a 68-year-old  woman, domiciled with granddaughter, 

, has 3 adult children, unemployed on SSI, with a past psychiatric history of 

bipolar disorder, alcohol and benzodiazepine use disorder, anxiety disorder, 

multiple psychiatric admissions, one previous suicide attempt, with a past 

medical history significant for CML, CAD, CHF, HLD, HTN, COPD, seizure disorder

, MI, who was brought in under [place] act by police due to suicide ideation 

which patient was admitted to the inpatient psychiatry for further evaluation 

and management.  Patient was found lying hospital bed noted B, cooperative.  

Patient states that she is feeling somewhat better and she is "out of the 

situation" referring to her living with her granddaughter at this time.  

Patient states that she has been having difficulty every time she goes back to 

live with her and this is the fourth time.  Patient mentions that she was 

following with Dr. Cordoba oncology clinic last seen a couple months ago after her 

grandmother had told her that she was dismissed from the clinic although she 

cannot receiving letters requesting communication with the clinic as she was 

not attending her appointments.  Patient reports having had decreased sleep, 

appetite, energy and concentration along with feeling depressed but denying any 

suicide ideations at this time.  Patient reports feeling "good" but continues 

to feel worried about her finances as her granddaughter has access to her bank 

accounts.  Patient endorsing some paranoia regarding her granddaughter and that 

they are stealing her money as well as having endorsed being watched in her 

home as well as here in the hospital.


Tobacco Use In Past 30 Days:  No Tobacco Past 30 Days


Alcohol Use:  Never


Hospital Course


Patient was admitted to locked psychiatric unit.  All safety precautions were 

maintained throughout visit.  Patient was followed daily by a psychiatric 

provider as well as case management.  Her medications were titrated for 

psychiatric stabilization.  Additionally, she was provided with opportunities 

to attend group therapy.  Upon examination today I found patient to be awake, 

alert, and oriented 4.  Her speech is clear, logical, and organized.  There is 

no indication of internal stimulation nor thought blocking.  She denies feeling 

suicidal, homicidal, or experiencing auditory or visual hallucinations.  Staff 

reports she has responded well to therapy, and has been socializing with the 

other patients.  She has been observed frequently reading and interacting with 

the other patients.  She does not appear psychotic nor manic at this time.  I 

can elicit no delusional material.  It does not appear the patient poses an 

eminent danger to herself or others at this time.  I believe she has reached 

maximum benefit from this admission.  Placement has been arranged at [facility] 

skilled nursing [facility].  She will be discharged with prescriptions and 

counseled to return to the nearest hospital should her condition worsen.





Results


Blood Pressure


105 / 50





Vital Signs








  Date Time  Temp Pulse Resp B/P (MAP) Pulse Ox O2 Delivery O2 Flow Rate FiO2


 


6/19/18 06:00 97.7 62 16 105/50 (68) 92   











 Laboratory Results








Test


  5/25/18


10:10


 


Cholesterol Level


  282 MG/DL


(120-200)


 


HDL Cholesterol


  45.2 MG/DL


(40.0-60.0)


 


Hemoglobin A1c


  6.1 %


(4.3-6.0)


 


LDL Cholesterol


  211 MG/DL


(0-99)


 


Triglycerides Level


  129 MG/DL


()








Summary of Procedures


N/A


Pending results at discharge:  No





Medications


# of Antipsychotic meds at D/C:  1


Approp Antipsych med options


1 - Minimum of three failed multiple trials of monotherapy.


2 - Documented plan to taper to monotherapy due to previous use of multiple 

meds OR cross-taper in progress at D/C.


3 - Documentation of augmentation of Clozapine.


4 - Justification other than those listed in allowable values 1-3, document here

:





Discharge


Discharge Date:  Jun 19, 2018


Discharge Diagnosis:  


(1) Unspecified psychosis


ICD Code:  F29 - Unspecified psychosis not due to a substance or known 

physiological condition


Pt Condition on Discharge:  Stable


Discharge Disposition:  Discharge to SNF





Discharge Instructions


Diet Instructions:  As Tolerated, No Restrictions


Activities you can perform:  Regular-No Restrictions


Scheduled Appointment:  At [facility]





Discharge Time


> 30 minutes





Mental Status Examination


Appearance:  Appropriate


Consciousness:  Alert


Orientation:  x4


Motor Activity:  Other (No motor abnormalities noted)


Speech:  Unremarkable


Language:  Adequate


Fund of Knowledge:  Adequate


Attention and Concentration:  Adequate


Memory:  Unremarkable (Grossly intact on clinical exam)


Mood:  Appropriate


Affect:  Appropriate


Thought Process & Associations:  Intact


Thought Content:  Appropriate


Hallucination Type:  None


Delusion Type:  Paranoid (mild)


Suicidal Ideation:  No


Suicidal Plan:  No


Suicidal Intention:  No


Homicidal Ideation:  No


Homicidal Plan:  No


Homicidal Intention:  No


Insight:  Fair


Judgment:  Impulsive





Discharge/Advance Care Plan


Health Problems:  


(1) Unspecified psychosis


Goals to promote your health


* To prevent worsening of your condition and complications


* To maintain your health at the optimal level


Directions to meet your goals


*** Take your medications as prescribed


***  Follow your dietary instruction


***  Follow activity as directed





***  Keep your appointments as scheduled


***  Take your immunizations and boosters as scheduled


***  If your symptoms worsen call your PCP, if no PCP go to Urgent Care Center 

or Emergency Room ***


***  For 24/7 questions related to your inpatient stay or results of tests 

pending at discharge, please contact Dr. Selma Reeves at [phone]


***  Smoking is Dangerous to Your Health. Avoid second hand smoking ***











Selma Reeves Jun 19, 2018 09:07

## 2018-06-19 NOTE — PD.TTN
Patient Problems


1. Discharge planning


2. Medication compliance


3. Knowledge deficit


4. Lack of coping skills





Progress Toward Goals


Provider Present:  Dr. GREY Lundy


Provider Input:  


6/11/18 patient is overall stable and compliant 


6/6/18Pending DC to [facility]


Nurse(s) Present:  Mj


Nurse(s) Input:  


6/11/18 Joe: patient is compliant and well, engaging and out for meals 


6/6/18Pt is cooperative, pleasant, social, has concerns about Cancer


Psychiatric Counselors Present:  Gilda Marte LCSW, Meem Brenner, [facility], 

Eugene Olivares Jr., [facility], Mandy Arora, [facility]


Psych Therapist Input:  


6/11/18 asked for Cecy Booth to work with family and hospital on


placement


6/19/18-Maggie- Contract to be signed to go to [facility] for respite; then


leave state with daughter.


Group Spec/RT/OT/ALVAREZ Present:  KIM Mcnamara, Cesar Rivas, OT


Group Spec/RT/OT/ALVAREZ Input:  


6/11/18 attends select groups 


6/6/18Pt attends select groups on unit. Remains isolated to room. 


6/19/18- Pt. does not attend groups.











Anastacio Oh Jun 19, 2018 14:48